# Patient Record
Sex: FEMALE | Race: WHITE | NOT HISPANIC OR LATINO | Employment: FULL TIME | URBAN - NONMETROPOLITAN AREA
[De-identification: names, ages, dates, MRNs, and addresses within clinical notes are randomized per-mention and may not be internally consistent; named-entity substitution may affect disease eponyms.]

---

## 2017-02-09 ENCOUNTER — OFFICE VISIT (OUTPATIENT)
Dept: CARDIOLOGY | Facility: CLINIC | Age: 47
End: 2017-02-09

## 2017-02-09 VITALS
OXYGEN SATURATION: 99 % | DIASTOLIC BLOOD PRESSURE: 73 MMHG | HEART RATE: 80 BPM | HEIGHT: 67 IN | WEIGHT: 206.2 LBS | BODY MASS INDEX: 32.36 KG/M2 | SYSTOLIC BLOOD PRESSURE: 120 MMHG

## 2017-02-09 DIAGNOSIS — I10 ESSENTIAL HYPERTENSION: ICD-10-CM

## 2017-02-09 DIAGNOSIS — R60.9 PERIPHERAL EDEMA: ICD-10-CM

## 2017-02-09 DIAGNOSIS — I25.119 CORONARY ARTERY DISEASE INVOLVING NATIVE CORONARY ARTERY WITH ANGINA PECTORIS, UNSPECIFIED WHETHER NATIVE OR TRANSPLANTED HEART (HCC): ICD-10-CM

## 2017-02-09 DIAGNOSIS — R53.83 MALAISE AND FATIGUE: ICD-10-CM

## 2017-02-09 DIAGNOSIS — E78.5 DYSLIPIDEMIA: ICD-10-CM

## 2017-02-09 DIAGNOSIS — Z78.9 HEALTH MAINTENANCE ALTERATION: ICD-10-CM

## 2017-02-09 DIAGNOSIS — I25.10 CAD IN NATIVE ARTERY: Primary | Chronic | ICD-10-CM

## 2017-02-09 DIAGNOSIS — F41.9 ANXIETY: Chronic | ICD-10-CM

## 2017-02-09 DIAGNOSIS — E78.5 HYPERLIPIDEMIA, UNSPECIFIED HYPERLIPIDEMIA TYPE: ICD-10-CM

## 2017-02-09 DIAGNOSIS — R53.81 MALAISE AND FATIGUE: ICD-10-CM

## 2017-02-09 PROCEDURE — 99213 OFFICE O/P EST LOW 20 MIN: CPT | Performed by: NURSE PRACTITIONER

## 2017-02-09 RX ORDER — BUPROPION HYDROCHLORIDE 300 MG/1
300 TABLET ORAL DAILY
Qty: 90 TABLET | Refills: 3 | Status: SHIPPED | OUTPATIENT
Start: 2017-02-09 | End: 2017-03-13 | Stop reason: SDUPTHER

## 2017-02-09 RX ORDER — ATORVASTATIN CALCIUM 40 MG/1
40 TABLET, FILM COATED ORAL EVERY EVENING
Qty: 90 TABLET | Refills: 3 | Status: SHIPPED | OUTPATIENT
Start: 2017-02-09 | End: 2018-04-12 | Stop reason: SDUPTHER

## 2017-02-09 RX ORDER — BUPROPION HYDROCHLORIDE 300 MG/1
300 TABLET ORAL DAILY
Qty: 90 TABLET | Refills: 3 | Status: SHIPPED | OUTPATIENT
Start: 2017-02-09 | End: 2017-02-09

## 2017-02-09 RX ORDER — NITROGLYCERIN 0.4 MG/1
0.4 TABLET SUBLINGUAL
Qty: 30 TABLET | Refills: 3 | Status: SHIPPED | OUTPATIENT
Start: 2017-02-09 | End: 2017-05-04 | Stop reason: SDUPTHER

## 2017-02-09 RX ORDER — FUROSEMIDE 20 MG/1
20 TABLET ORAL DAILY
Qty: 90 TABLET | Refills: 3 | Status: SHIPPED | OUTPATIENT
Start: 2017-02-09 | End: 2019-06-05 | Stop reason: SDUPTHER

## 2017-02-09 RX ORDER — CLOPIDOGREL BISULFATE 75 MG/1
75 TABLET ORAL DAILY
Qty: 90 TABLET | Refills: 3 | Status: SHIPPED | OUTPATIENT
Start: 2017-02-09 | End: 2018-03-05 | Stop reason: SDUPTHER

## 2017-02-09 RX ORDER — FUROSEMIDE 20 MG/1
TABLET ORAL
Qty: 90 TABLET | Refills: 3 | Status: SHIPPED | OUTPATIENT
Start: 2017-02-09 | End: 2017-02-09 | Stop reason: SDUPTHER

## 2017-02-09 RX ORDER — POTASSIUM CHLORIDE 750 MG/1
10 TABLET, EXTENDED RELEASE ORAL DAILY
Qty: 90 TABLET | Refills: 3 | Status: SHIPPED | OUTPATIENT
Start: 2017-02-09 | End: 2017-04-25 | Stop reason: SDUPTHER

## 2017-02-09 RX ORDER — POTASSIUM CHLORIDE 750 MG/1
TABLET, EXTENDED RELEASE ORAL
Qty: 90 TABLET | Refills: 3 | Status: SHIPPED | OUTPATIENT
Start: 2017-02-09 | End: 2017-02-09 | Stop reason: SDUPTHER

## 2017-02-09 NOTE — PROGRESS NOTES
Subjective   Radha Van is a 46 y.o. female     Chief Complaint   Patient presents with   • Follow-up     6 mo.       HPI    Problem List:    1. CAD  1.1. LHC 1/7/16 - Stent LAD  2. Hypertension  3. Dyspnea  3.1 Echo 11/2/15 - mild LVH; EF > 65%; DD I; trace MR & TR; mild MD; PA 20-25  4. Edema  5. Anxiety     Patient is a 46-year-old female who presents today for a follow-up.  She denies any chest pain, pressure, palpitations, fluttering, dizziness, presyncope, syncope, orthopnea, PND or edema.  She denies any shortness of breath with her daily routine.  She says she has been doing very well for the most part.  She has been having a little increase in anxiety so she would like to try to increase her dose of her Wellbutrin.  We went over her labs.     Current Outpatient Prescriptions   Medication Sig Dispense Refill   • aspirin (ASPIRIN LOW DOSE) 81 MG EC tablet Take 81 mg by mouth Daily.     • atorvastatin (LIPITOR) 40 MG tablet Take 1 tablet by mouth Every Evening. 90 tablet 3   • clopidogrel (PLAVIX) 75 MG tablet Take 1 tablet by mouth Daily. 90 tablet 3   • furosemide (LASIX) 20 MG tablet MAY TAKE AN ADDITIONAL DOSE ON THE DAYS THAT THE SWELLING IS INCREASED. 90 tablet 3   • potassium chloride (KLOR-CON) 10 MEQ CR tablet MAY TAKE AN ADDITIONAL DOSE ON THE DAYS THAT THE SWELLING IS INCREASED WITH LASIX 90 tablet 3   • buPROPion XL (WELLBUTRIN XL) 300 MG 24 hr tablet Take 1 tablet by mouth Daily. 90 tablet 3   • nitroglycerin (NITROSTAT) 0.4 MG SL tablet Place 1 tablet under the tongue Every 5 (Five) Minutes As Needed for chest pain. 30 tablet 3     No current facility-administered medications for this visit.        ALLERGIES    Review of patient's allergies indicates no known allergies.    Past Medical History   Diagnosis Date   • Ankle fracture, right      She does have edema in her RLE due to cracked bone in her right ankle. Patient says she fell down stairs. She is suppose to have a boot on, but because  of the weather she wore a sneaker. Patient says that she had just started to exercise again when this happened. Foot has been painful today.   • Anxiety    • CAD in native artery      1.1. LHC 1/7/16 - Stent LAD   • Chest pain    • Dyspnea      3.1 Echo 11/2/15 - mild LVH; EF > 65%; DD I; trace MR & TR; mild [place]; PA 20-25   • Edema      Related to ankle fracture.   • Hypertension      The patient presents for follow-up of primary hypertension. The patient states she has been stable with her blood pressure control since the last visit. Comorbid Illnesses: coronary artery disease.    • Obesity      BMI 31.95.       Social History     Social History   • Marital status:      Spouse name: N/A   • Number of children: N/A   • Years of education: N/A     Occupational History   • Not on file.     Social History Main Topics   • Smoking status: Never Smoker   • Smokeless tobacco: Not on file   • Alcohol use No   • Drug use: No   • Sexual activity: Not on file     Other Topics Concern   • Not on file     Social History Narrative       Family History   Problem Relation Age of Onset   • Colon cancer Other    • Stroke Other      Total of 3   • Hypertension Other    • Heart attack Other    • Other Other      High cholesterol & Heart Surgery   • Lupus Other      Systemic lupus erythematosus       Review of Systems   Constitutional: Positive for fatigue. Negative for diaphoresis.   HENT: Positive for rhinorrhea and sneezing.    Eyes: Positive for visual disturbance (glasses).   Respiratory: Negative for chest tightness and shortness of breath.    Cardiovascular: Negative for chest pain, palpitations and leg swelling.   Gastrointestinal: Negative for nausea and vomiting.   Endocrine: Negative.    Genitourinary: Positive for difficulty urinating.   Musculoskeletal: Positive for arthralgias (arthritis ). Negative for back pain and neck pain.   Skin: Negative.    Allergic/Immunologic: Positive for environmental allergies  "(seasonal).   Neurological: Positive for headaches (cluster headaches at times ). Negative for dizziness, syncope and light-headedness.   Hematological: Bruises/bleeds easily (bruise).   Psychiatric/Behavioral: Negative.        Objective   Visit Vitals   • /73 (BP Location: Left arm, Patient Position: Sitting)   • Pulse 80   • Ht 67\" (170.2 cm)   • Wt 206 lb 3.2 oz (93.5 kg)   • SpO2 99%   • BMI 32.3 kg/m2     Lab Results (most recent)     None        Physical Exam   Constitutional: She is oriented to person, place, and time. Vital signs are normal. She appears well-developed and well-nourished. She is active and cooperative.   HENT:   Head: Normocephalic.   Eyes: Lids are normal.   Neck: Normal carotid pulses, no hepatojugular reflux and no JVD present. Carotid bruit is not present.   Cardiovascular: Normal rate, regular rhythm and normal heart sounds.    Pulses:       Radial pulses are 2+ on the right side, and 2+ on the left side.        Dorsalis pedis pulses are 2+ on the right side, and 2+ on the left side.        Posterior tibial pulses are 2+ on the right side, and 2+ on the left side.   No edema BLE.    Pulmonary/Chest: Effort normal and breath sounds normal.   Abdominal: Normal appearance.   Neurological: She is alert and oriented to person, place, and time.   Skin: Skin is warm, dry and intact.   Psychiatric: She has a normal mood and affect. Her speech is normal and behavior is normal. Judgment and thought content normal. Cognition and memory are normal.         Assessment/Plan      Diagnosis Plan   1. CAD in native artery  nitroglycerin (NITROSTAT) 0.4 MG SL tablet    Lipid Panel    Comprehensive Metabolic Panel    Comprehensive Metabolic Panel    Lipid Panel   2. Essential hypertension     3. Hyperlipidemia, unspecified hyperlipidemia type  Lipid Panel    Lipid Panel   4. Dyslipidemia  atorvastatin (LIPITOR) 40 MG tablet    clopidogrel (PLAVIX) 75 MG tablet   5. Coronary artery disease involving " native coronary artery with angina pectoris, unspecified whether native or transplanted heart  atorvastatin (LIPITOR) 40 MG tablet    clopidogrel (PLAVIX) 75 MG tablet   6. Peripheral edema  potassium chloride (KLOR-CON) 10 MEQ CR tablet    furosemide (LASIX) 20 MG tablet   7. Health maintenance alteration  Lipid Panel    Comprehensive Metabolic Panel    TSH    CBC & Differential    Vitamin B12    Folate    Vitamin D 1,25 Dihydroxy    Comprehensive Metabolic Panel    CBC & Differential    Lipid Panel    TSH    Vitamin B12    Folate    Vitamin D 1,25 Dihydroxy   8. Malaise and fatigue  TSH    CBC & Differential    Vitamin B12    Folate    Vitamin D 1,25 Dihydroxy    CBC & Differential    TSH    Vitamin B12    Folate    Vitamin D 1,25 Dihydroxy   9. Anxiety  buPROPion XL (WELLBUTRIN XL) 300 MG 24 hr tablet       Return in about 6 months (around 8/9/2017).         Patient is doing very well from a cardiac standpoint.  She will continue her medication except we will increase her Wellbutrin.  We discussed coming off of Plavix, but she would like to stay on it.  She will get lipids, CMP, TSH, Vit B12, Vit D and CBC.  She will follow-up in 6 mos or sooner if any changes.

## 2017-02-16 ENCOUNTER — TELEPHONE (OUTPATIENT)
Dept: CARDIOLOGY | Facility: CLINIC | Age: 47
End: 2017-02-16

## 2017-02-16 NOTE — TELEPHONE ENCOUNTER
----- Message from Tina Dejesus sent at 2/16/2017  9:59 AM EST -----  Contact: PATIENT  THE PATIENT WANTS TO KNOW IF SHE IS ABLE TO TAKE ZYRTEC WITH THE MEDICATIONS WE CURRENTLY HAVE HER ON.  SHE CAN BE REACHED [phone].  THANKS

## 2017-03-13 DIAGNOSIS — F41.9 ANXIETY: Chronic | ICD-10-CM

## 2017-03-13 RX ORDER — BUPROPION HYDROCHLORIDE 300 MG/1
300 TABLET ORAL DAILY
Qty: 90 TABLET | Refills: 3 | Status: SHIPPED | OUTPATIENT
Start: 2017-03-13 | End: 2017-03-13 | Stop reason: SDUPTHER

## 2017-03-13 RX ORDER — BUPROPION HYDROCHLORIDE 300 MG/1
300 TABLET ORAL DAILY
Qty: 90 TABLET | Refills: 3 | Status: SHIPPED | OUTPATIENT
Start: 2017-03-13 | End: 2018-04-12 | Stop reason: SDUPTHER

## 2017-04-25 DIAGNOSIS — R60.9 PERIPHERAL EDEMA: ICD-10-CM

## 2017-04-25 RX ORDER — POTASSIUM CHLORIDE 750 MG/1
10 TABLET, EXTENDED RELEASE ORAL DAILY
Qty: 90 TABLET | Refills: 3 | Status: SHIPPED | OUTPATIENT
Start: 2017-04-25 | End: 2018-05-17 | Stop reason: SDUPTHER

## 2017-04-25 RX ORDER — POTASSIUM CHLORIDE 750 MG/1
10 TABLET, EXTENDED RELEASE ORAL DAILY
Qty: 90 TABLET | Refills: 3 | Status: SHIPPED | OUTPATIENT
Start: 2017-04-25 | End: 2017-08-09 | Stop reason: ALTCHOICE

## 2017-05-04 DIAGNOSIS — I25.10 CAD IN NATIVE ARTERY: Chronic | ICD-10-CM

## 2017-05-04 RX ORDER — NITROGLYCERIN 0.4 MG/1
0.4 TABLET SUBLINGUAL
Qty: 30 TABLET | Refills: 3 | Status: SHIPPED | OUTPATIENT
Start: 2017-05-04 | End: 2017-08-11 | Stop reason: SDUPTHER

## 2017-08-09 ENCOUNTER — OFFICE VISIT (OUTPATIENT)
Dept: CARDIOLOGY | Facility: CLINIC | Age: 47
End: 2017-08-09

## 2017-08-09 VITALS
OXYGEN SATURATION: 98 % | HEART RATE: 88 BPM | DIASTOLIC BLOOD PRESSURE: 77 MMHG | HEIGHT: 67 IN | WEIGHT: 204.4 LBS | BODY MASS INDEX: 32.08 KG/M2 | SYSTOLIC BLOOD PRESSURE: 117 MMHG

## 2017-08-09 DIAGNOSIS — I25.10 CAD IN NATIVE ARTERY: Primary | Chronic | ICD-10-CM

## 2017-08-09 DIAGNOSIS — E78.5 HYPERLIPIDEMIA, UNSPECIFIED HYPERLIPIDEMIA TYPE: ICD-10-CM

## 2017-08-09 DIAGNOSIS — I10 ESSENTIAL HYPERTENSION: ICD-10-CM

## 2017-08-09 PROCEDURE — 93000 ELECTROCARDIOGRAM COMPLETE: CPT | Performed by: NURSE PRACTITIONER

## 2017-08-09 PROCEDURE — 99214 OFFICE O/P EST MOD 30 MIN: CPT | Performed by: NURSE PRACTITIONER

## 2017-08-09 RX ORDER — MEDROXYPROGESTERONE ACETATE 10 MG/1
10 TABLET ORAL DAILY
Refills: 0 | COMMUNITY
Start: 2017-07-28 | End: 2018-09-05

## 2017-08-09 NOTE — PATIENT INSTRUCTIONS
Edema  Edema is an abnormal buildup of fluids in your body tissues. Edema is somewhat dependent on gravity to pull the fluid to the lowest place in your body. That makes the condition more common in the legs and thighs (lower extremities). Painless swelling of the feet and ankles is common and becomes more likely as you get older. It is also common in looser tissues, like around your eyes.   When the affected area is squeezed, the fluid may move out of that spot and leave a dent for a few moments. This dent is called pitting.   CAUSES   There are many possible causes of edema. Eating too much salt and being on your feet or sitting for a long time can cause edema in your legs and ankles. Hot weather may make edema worse. Common medical causes of edema include:  · Heart failure.  · Liver disease.  · Kidney disease.  · Weak blood vessels in your legs.  · Cancer.  · An injury.  · Pregnancy.  · Some medications.  · Obesity.   SYMPTOMS   Edema is usually painless. Your skin may look swollen or shiny.   DIAGNOSIS   Your health care provider may be able to diagnose edema by asking about your medical history and doing a physical exam. You may need to have tests such as X-rays, an electrocardiogram, or blood tests to check for medical conditions that may cause edema.   TREATMENT   Edema treatment depends on the cause. If you have heart, liver, or kidney disease, you need the treatment appropriate for these conditions. General treatment may include:  · Elevation of the affected body part above the level of your heart.  · Compression of the affected body part. Pressure from elastic bandages or support stockings squeezes the tissues and forces fluid back into the blood vessels. This keeps fluid from entering the tissues.  · Restriction of fluid and salt intake.  · Use of a water pill (diuretic). These medications are appropriate only for some types of edema. They pull fluid out of your body and make you urinate more often. This  gets rid of fluid and reduces swelling, but diuretics can have side effects. Only use diuretics as directed by your health care provider.  HOME CARE INSTRUCTIONS   · Keep the affected body part above the level of your heart when you are lying down.    · Do not sit still or stand for prolonged periods.    · Do not put anything directly under your knees when lying down.  · Do not wear constricting clothing or garters on your upper legs.    · Exercise your legs to work the fluid back into your blood vessels. This may help the swelling go down.    · Wear elastic bandages or support stockings to reduce ankle swelling as directed by your health care provider.    · Eat a low-salt diet to reduce fluid if your health care provider recommends it.    · Only take medicines as directed by your health care provider.   SEEK MEDICAL CARE IF:   · Your edema is not responding to treatment.  · You have heart, liver, or kidney disease and notice symptoms of edema.  · You have edema in your legs that does not improve after elevating them.    · You have sudden and unexplained weight gain.  SEEK IMMEDIATE MEDICAL CARE IF:   · You develop shortness of breath or chest pain.    · You cannot breathe when you lie down.  · You develop pain, redness, or warmth in the swollen areas.    · You have heart, liver, or kidney disease and suddenly get edema.  · You have a fever and your symptoms suddenly get worse.  MAKE SURE YOU:   · Understand these instructions.  · Will watch your condition.  · Will get help right away if you are not doing well or get worse.     This information is not intended to replace advice given to you by your health care provider. Make sure you discuss any questions you have with your health care provider.     Document Released: 12/18/2006 Document Revised: 04/10/2017 Document Reviewed: 10/10/2014  [employer] Interactive Patient Education ©2017 [employer]  Coronary Artery Disease, Female  Coronary artery disease (CAD) is a  process in which the blood vessels of the heart (coronary arteries) become narrow or blocked. The narrowing or blockage can lead to decreased blood flow to the heart muscle (angina). Symptoms known as angina can develop if the blood flow is reduced to the heart for a short period of time. Prolonged reduced blood flow can cause a heart attack (myocardial infarction, MI).  CAD is a leading cause of death for women. More women die from CAD than from cancer, lung disease, and accidents combined. It is important for women to understand the risks, symptoms, and treatment options for CAD.  CAUSES  Atherosclerosis is the cause of CAD. Atherosclerosis is the buildup of fat and cholesterol (plaque) on the inside of the arteries. Over time, the plaque may narrow or block the artery, and this will lessen blood flow to the heart. Plaque can also become weak and break off within a coronary artery to form a clot and cause a sudden blockage.  RISK FACTORS  Many risk factors increase your chances of getting CAD, including:  · High cholesterol levels.  · High blood pressure (hypertension).  · Tobacco use.  · Diabetes.  · Age. Women over age 55 are at a greater risk of CAD.  · Menopause.    All postmenopausal women are at greater risk of CAD.    Women who have experienced menopause between the ages of 40-45 (early menopause) are at a higher risk of CAD.    Women who have experienced menopause before age 40 (premature menopause) are at an extremely high risk of CAD.  · Family history of CAD.  · Obesity.  · Lack of exercise.  · A diet high in saturated fats.  SYMPTOMS   Many people do not experience any symptoms during the early stages of CAD. As the condition progresses, symptoms may include:  · Chest pain.    The pain can be described as crushing or squeezing, or a tightness, pressure, fullness, or heaviness in the chest.    The pain can last more than a few minutes or can stop and recur.  · Pain in the arms, neck, jaw, or  back.  · Unexplained heartburn or indigestion.  · Shortness of breath.  · Nausea.  · Sudden cold sweats.  · Sudden light-headedness.  Many women have chest discomfort and the other symptoms. However, women often have different (atypical) symptoms, such as:  · Fatigue.  · Unexplained feelings of nervousness or anxiety.  · Unexplained weakness.  · Dizziness or fainting.  Sometimes, women may not have any symptoms of CAD.  DIAGNOSIS   Tests to diagnose CAD may include:  · ECG (electrocardiogram).  · Exercise stress test. This looks for signs of blockage when the heart is being exercised.  · Pharmacologic stress test. This test looks for signs of blockage when the heart is being stressed with a medicine.  · Blood tests.  · Coronary angiogram. This is a procedure to look at the coronary arteries to see if there is any blockage.  TREATMENT  The treatment of CAD may include the following:  · Healthy behavioral changes to reduce or control risk factors.  · Medicine.  · Coronary stenting. A stent helps to keep an artery open.  · Coronary angioplasty. This procedure widens a narrowed or blocked artery.  · Coronary artery bypass surgery. This will allow your blood to pass the blockage (bypass) to reach your heart.  HOME CARE INSTRUCTIONS  · Take medicines only as directed by your health care provider.  · Do not take the following medicines unless your health care provider approves:    Nonsteroidal anti-inflammatory drugs (NSAIDs), such as ibuprofen, naproxen, or celecoxib.    Vitamin supplements that contain vitamin A, vitamin E, or both.    Hormone replacement therapy that contains estrogen with or without progestin.  · Manage other health conditions such as hypertension and diabetes as directed by your health care provider.  · Follow a heart-healthy diet. A dietitian can help to educate you about healthy food options and changes.  · Use healthy cooking methods such as roasting, grilling, broiling, baking, poaching, steaming,  or stir-frying. Talk to a dietitian to learn more about healthy cooking methods.  · Follow an exercise program approved by your health care provider.  · Maintain a healthy weight. Lose weight as approved by your health care provider.  · Plan rest periods when fatigued.  · Learn to manage stress.  · Do not use any tobacco products, including cigarettes, chewing tobacco, or electronic cigarettes. If you need help quitting, ask your health care provider.  · If you drink alcohol, and your health care provider approves, limit your alcohol intake to no more than 1 drink per day. One drink equals 12 ounces of beer, 5 ounces of wine, or 1½ ounces of hard liquor.  · Stop illegal drug use.  · Your health care provider may ask you to monitor your blood pressure. A blood pressure reading consists of a higher number over a lower number, such as 110 over 72, which is written as 110/72. Ideally, your blood pressure should be:    Below 140/90 if you have no other medical conditions.    Below 130/80 if you have diabetes or kidney disease.  · Keep all follow-up visits as directed by your health care provider. This is important.  SEEK IMMEDIATE MEDICAL CARE IF:  · You have pain in your chest, neck, arm, jaw, stomach, or back that lasts more than a few minutes, is recurring, or is unrelieved by taking medicine under your tongue (sublingual nitroglycerin).  · You have profuse sweating without cause.  · You have unexplained:    Heartburn or indigestion.    Shortness of breath or difficulty breathing.    Nausea or vomiting.    Fatigue.    Feelings of nervousness or anxiety.    Weakness.    Diarrhea.  · You have sudden light-headedness or dizziness.  · You faint.  These symptoms may represent a serious problem that is an emergency. Do not wait to see if the symptoms will go away. Get medical help right away. Call your local emergency services (911 in the [place] Do not drive yourself to the hospital.     This information is not intended to  replace advice given to you by your health care provider. Make sure you discuss any questions you have with your health care provider.     Document Released: 03/11/2013 Document Revised: 01/08/2016 Document Reviewed: 04/21/2015  [employer] Interactive Patient Education ©2017 [employer]

## 2017-08-09 NOTE — PROGRESS NOTES
Subjective   Radha Van is a 46 y.o. female     Chief Complaint   Patient presents with   • Hypertension     patient presents for a follow up, denies any complants        HPI    Problem List:    1. CAD  1.1. LHC 1/7/16 - Stent LAD  2. Hypertension  3. Dyspnea  3.1 Echo 11/2/15 - mild LVH; EF > 65%; DD I; trace MR & TR; mild HI; PA 20-25  4. Edema  5. Anxiety     Patient is a 46-year-old female who presents today for follow-up.  She denies any chest pain, pressure, palpitations, fluttering, dizziness, presyncope, syncope, orthopnea or PND.  She will get swelling only if she is in legs all day and leg yesterday she was sitting on concrete.  She says this goes away overnight.  She denies any shortness of breath with activity.      We went over her labs.  Current Outpatient Prescriptions   Medication Sig Dispense Refill   • aspirin (ASPIRIN LOW DOSE) 81 MG EC tablet Take 81 mg by mouth Daily.     • atorvastatin (LIPITOR) 40 MG tablet Take 1 tablet by mouth Every Evening. 90 tablet 3   • buPROPion XL (WELLBUTRIN XL) 300 MG 24 hr tablet Take 1 tablet by mouth Daily. 90 tablet 3   • clopidogrel (PLAVIX) 75 MG tablet Take 1 tablet by mouth Daily. 90 tablet 3   • furosemide (LASIX) 20 MG tablet Take 1 tablet by mouth Daily. MAY TAKE AN ADDITIONAL DOSE ON THE DAYS THAT THE SWELLING IS INCREASED. 90 tablet 3   • medroxyPROGESTERone (PROVERA) 10 MG tablet Take 10 mg by mouth Daily. Patient takes from 16-25 of the month  0   • nitroglycerin (NITROSTAT) 0.4 MG SL tablet Place 1 tablet under the tongue Every 5 (Five) Minutes As Needed for Chest Pain. 30 tablet 3   • potassium chloride (KLOR-CON) 10 MEQ CR tablet Take 1 tablet by mouth Daily. MAY TAKE AN ADDITIONAL DOSE ON THE DAYS THAT THE SWELLING IS INCREASED WITH LASIX 90 tablet 3     No current facility-administered medications for this visit.        ALLERGIES    Review of patient's allergies indicates no known allergies.    Past Medical History:   Diagnosis Date   •  Ankle fracture, right     She does have edema in her RLE due to cracked bone in her right ankle. Patient says she fell down stairs. She is suppose to have a boot on, but because of the weather she wore a sneaker. Patient says that she had just started to exercise again when this happened. Foot has been painful today.   • Anxiety    • CAD in native artery     1.1. C 1/7/16 - Stent LAD   • Chest pain    • Diverticulitis    • Dyspnea     3.1 Echo 11/2/15 - mild LVH; EF > 65%; DD I; trace MR & TR; mild [place]; PA 20-25   • Edema     Related to ankle fracture.   • Hypertension     The patient presents for follow-up of primary hypertension. The patient states she has been stable with her blood pressure control since the last visit. Comorbid Illnesses: coronary artery disease.    • Obesity     BMI 31.95.       Social History     Social History   • Marital status:      Spouse name: N/A   • Number of children: N/A   • Years of education: N/A     Occupational History   • Not on file.     Social History Main Topics   • Smoking status: Never Smoker   • Smokeless tobacco: Not on file   • Alcohol use No   • Drug use: No   • Sexual activity: Not on file     Other Topics Concern   • Not on file     Social History Narrative       Family History   Problem Relation Age of Onset   • Colon cancer Other    • Stroke Other      Total of 3   • Hypertension Other    • Heart attack Other    • Other Other      High cholesterol & Heart Surgery   • Lupus Other      Systemic lupus erythematosus       Review of Systems   Constitutional: Negative for diaphoresis and fatigue.   HENT: Positive for sinus pressure. Negative for congestion, postnasal drip, rhinorrhea and sneezing.    Eyes: Positive for visual disturbance (reading glasses ).   Respiratory: Negative for chest tightness and shortness of breath.    Cardiovascular: Positive for leg swelling (yesterday ankles swollen, but on concrete all day ). Negative for chest pain and palpitations.  "  Gastrointestinal: Negative for nausea and vomiting.   Endocrine: Negative.    Genitourinary: Positive for frequency. Negative for difficulty urinating.   Musculoskeletal: Positive for back pain (low back, right before period ). Negative for arthralgias and neck pain.   Skin: Negative.    Allergic/Immunologic: Positive for environmental allergies.   Neurological: Positive for headaches (with sinus pressure ). Negative for dizziness, syncope and light-headedness.   Hematological: Negative.    Psychiatric/Behavioral: The patient is nervous/anxious.        Objective   /77 (BP Location: Left arm, Patient Position: Sitting)  Pulse 88  Ht 67\" (170.2 cm)  Wt 204 lb 6.4 oz (92.7 kg)  SpO2 98%  BMI 32.01 kg/m2  Lab Results (most recent)     None        Physical Exam   Constitutional: She is oriented to person, place, and time. Vital signs are normal. She appears well-developed and well-nourished. She is active and cooperative.   HENT:   Head: Normocephalic.   Eyes: Lids are normal.   Neck: Normal carotid pulses, no hepatojugular reflux and no JVD present. Carotid bruit is not present.   Cardiovascular: Normal rate, regular rhythm and normal heart sounds.    Pulses:       Radial pulses are 2+ on the right side, and 2+ on the left side.        Dorsalis pedis pulses are 2+ on the right side, and 2+ on the left side.        Posterior tibial pulses are 2+ on the right side, and 2+ on the left side.   No edema BLE.    Pulmonary/Chest: Effort normal and breath sounds normal.   Abdominal: Normal appearance and bowel sounds are normal.   Neurological: She is alert and oriented to person, place, and time.   Skin: Skin is warm, dry and intact.   Psychiatric: She has a normal mood and affect. Her speech is normal and behavior is normal. Judgment and thought content normal. Cognition and memory are normal.       Procedure     ECG 12 Lead  Date/Time: 8/9/2017 9:21 AM  Performed by: MARA HERNANDEZ  Authorized by: MARA HERNANDEZ " S   Comparison: compared with previous ECG from 8/10/2016  Similar to previous ECG  Rhythm: sinus rhythm  Rate: normal  BPM: 71  QRS axis: normal  Clinical impression: normal ECG  Comments: Hypertension                   Assessment/Plan      Diagnosis Plan   1. CAD in native artery     2. Essential hypertension  ECG 12 Lead   3. Hyperlipidemia, unspecified hyperlipidemia type         Return in about 6 months (around 2/9/2018).    CAD-patient is on aspirin and statin.  Hypertension-patient doing well.  Hyperlipidemia-patient is on Lipitor and cholesterol is doing very well.  She will continue her medication regimen.  She will follow-up in 6 months or sooner if any changes.

## 2017-08-11 ENCOUNTER — TELEPHONE (OUTPATIENT)
Dept: CARDIOLOGY | Facility: CLINIC | Age: 47
End: 2017-08-11

## 2017-08-11 DIAGNOSIS — I25.10 CAD IN NATIVE ARTERY: Chronic | ICD-10-CM

## 2017-08-11 RX ORDER — NITROGLYCERIN 0.4 MG/1
0.4 TABLET SUBLINGUAL
Qty: 30 TABLET | Refills: 5 | Status: SHIPPED | OUTPATIENT
Start: 2017-08-11 | End: 2021-03-04 | Stop reason: SDUPTHER

## 2017-08-11 NOTE — TELEPHONE ENCOUNTER
----- Message from Isabela Velez sent at 8/11/2017  8:34 AM EDT -----  Contact: PT  NEEDS A REFILL OF GLYCERINE PILLS CALLED IN TO [facility]

## 2018-02-12 ENCOUNTER — TELEPHONE (OUTPATIENT)
Dept: CARDIOLOGY | Facility: CLINIC | Age: 48
End: 2018-02-12

## 2018-02-12 NOTE — TELEPHONE ENCOUNTER
----- Message from Sowmya Garcia sent at 2/12/2018  8:51 AM EST -----   JOEY HAS SOME QUESTIONS REGARDING SOME WORK RESTRICTIONS. SHE CAN BE REACHED [phone].

## 2018-03-05 DIAGNOSIS — E78.5 DYSLIPIDEMIA: ICD-10-CM

## 2018-03-05 DIAGNOSIS — I25.119 CORONARY ARTERY DISEASE INVOLVING NATIVE CORONARY ARTERY WITH ANGINA PECTORIS, UNSPECIFIED WHETHER NATIVE OR TRANSPLANTED HEART (HCC): ICD-10-CM

## 2018-03-05 RX ORDER — CLOPIDOGREL BISULFATE 75 MG/1
TABLET ORAL
Qty: 90 TABLET | Refills: 3 | Status: SHIPPED | OUTPATIENT
Start: 2018-03-05 | End: 2019-06-03 | Stop reason: SDUPTHER

## 2018-03-06 ENCOUNTER — OFFICE VISIT (OUTPATIENT)
Dept: CARDIOLOGY | Facility: CLINIC | Age: 48
End: 2018-03-06

## 2018-03-06 ENCOUNTER — APPOINTMENT (OUTPATIENT)
Dept: LAB | Facility: HOSPITAL | Age: 48
End: 2018-03-06

## 2018-03-06 VITALS
DIASTOLIC BLOOD PRESSURE: 77 MMHG | HEART RATE: 78 BPM | BODY MASS INDEX: 32.96 KG/M2 | HEIGHT: 67 IN | SYSTOLIC BLOOD PRESSURE: 117 MMHG | OXYGEN SATURATION: 96 % | WEIGHT: 210 LBS

## 2018-03-06 DIAGNOSIS — Z00.00 HEALTHCARE MAINTENANCE: ICD-10-CM

## 2018-03-06 DIAGNOSIS — I10 ESSENTIAL HYPERTENSION: ICD-10-CM

## 2018-03-06 DIAGNOSIS — E78.5 HYPERLIPIDEMIA, UNSPECIFIED HYPERLIPIDEMIA TYPE: ICD-10-CM

## 2018-03-06 DIAGNOSIS — I25.10 CAD IN NATIVE ARTERY: Primary | Chronic | ICD-10-CM

## 2018-03-06 DIAGNOSIS — R06.02 SHORTNESS OF BREATH: ICD-10-CM

## 2018-03-06 PROCEDURE — 99213 OFFICE O/P EST LOW 20 MIN: CPT | Performed by: NURSE PRACTITIONER

## 2018-03-06 PROCEDURE — 84443 ASSAY THYROID STIM HORMONE: CPT | Performed by: NURSE PRACTITIONER

## 2018-03-06 PROCEDURE — 80053 COMPREHEN METABOLIC PANEL: CPT | Performed by: NURSE PRACTITIONER

## 2018-03-06 PROCEDURE — 80061 LIPID PANEL: CPT | Performed by: NURSE PRACTITIONER

## 2018-03-06 NOTE — PATIENT INSTRUCTIONS
Obesity, Adult  Obesity is the condition of having too much total body fat. Being overweight or obese means that your weight is greater than what is considered healthy for your body size. Obesity is determined by a measurement called BMI. BMI is an estimate of body fat and is calculated from height and weight. For adults, a BMI of 30 or higher is considered obese.  Obesity can eventually lead to other health concerns and major illnesses, including:  · Stroke.  · Coronary artery disease (CAD).  · Type 2 diabetes.  · Some types of cancer, including cancers of the colon, breast, uterus, and gallbladder.  · Osteoarthritis.  · High blood pressure (hypertension).  · High cholesterol.  · Sleep apnea.  · Gallbladder stones.  · Infertility problems.  What are the causes?  The main cause of obesity is taking in (consuming) more calories than your body uses for energy. Other factors that contribute to this condition may include:  · Being born with genes that make you more likely to become obese.  · Having a medical condition that causes obesity. These conditions include:  ¨ Hypothyroidism.  ¨ Polycystic ovarian syndrome (PCOS).  ¨ Binge-eating disorder.  ¨ Cushing syndrome.  · Taking certain medicines, such as steroids, antidepressants, and seizure medicines.  · Not being physically active (sedentary lifestyle).  · Living where there are limited places to exercise safely or buy healthy foods.  · Not getting enough sleep.  What increases the risk?  The following factors may increase your risk of this condition:  · Having a family history of obesity.  · Being a woman of -American descent.  · Being a man of  descent.  What are the signs or symptoms?  Having excessive body fat is the main symptom of this condition.  How is this diagnosed?  This condition may be diagnosed based on:  · Your symptoms.  · Your medical history.  · A physical exam. Your health care provider may measure:  ¨ Your BMI. If you are an adult  with a BMI between 25 and less than 30, you are considered overweight. If you are an adult with a BMI of 30 or higher, you are considered obese.  ¨ The distances around your hips and your waist (circumferences). These may be compared to each other to help diagnose your condition.  ¨ Your skinfold thickness. Your health care provider may gently pinch a fold of your skin and measure it.  How is this treated?  Treatment for this condition often includes changing your lifestyle. Treatment may include some or all of the following:  · Dietary changes. Work with your health care provider and a dietitian to set a weight-loss goal that is healthy and reasonable for you. Dietary changes may include eating:  ¨ Smaller portions. A portion size is the amount of a particular food that is healthy for you to eat at one time. This varies from person to person.  ¨ Low-calorie or low-fat options.  ¨ More whole grains, fruits, and vegetables.  · Regular physical activity. This may include aerobic activity (cardio) and strength training.  · Medicine to help you lose weight. Your health care provider may prescribe medicine if you are unable to lose 1 pound a week after 6 weeks of eating more healthily and doing more physical activity.  · Surgery. Surgical options may include gastric banding and gastric bypass. Surgery may be done if:  ¨ Other treatments have not helped to improve your condition.  ¨ You have a BMI of 40 or higher.  ¨ You have life-threatening health problems related to obesity.  Follow these instructions at home:     Eating and drinking     · Follow recommendations from your health care provider about what you eat and drink. Your health care provider may advise you to:  ¨ Limit fast foods, sweets, and processed snack foods.  ¨ Choose low-fat options, such as low-fat milk instead of whole milk.  ¨ Eat 5 or more servings of fruits or vegetables every day.  ¨ Eat at home more often. This gives you more control over what you  eat.  ¨ Choose healthy foods when you eat out.  ¨ Learn what a healthy portion size is.  ¨ Keep low-fat snacks on hand.  ¨ Avoid sugary drinks, such as soda, fruit juice, iced tea sweetened with sugar, and flavored milk.  ¨ Eat a healthy breakfast.  · Drink enough water to keep your urine clear or pale yellow.  · Do not go without eating for long periods of time (do not fast) or follow a fad diet. Fasting and fad diets can be unhealthy and even dangerous.  Physical Activity   · Exercise regularly, as told by your health care provider. Ask your health care provider what types of exercise are safe for you and how often you should exercise.  · Warm up and stretch before being active.  · Cool down and stretch after being active.  · Rest between periods of activity.  Lifestyle   · Limit the time that you spend in front of your TV, computer, or video game system.  · Find ways to reward yourself that do not involve food.  · Limit alcohol intake to no more than 1 drink a day for nonpregnant women and 2 drinks a day for men. One drink equals 12 oz of beer, 5 oz of wine, or 1½ oz of hard liquor.  General instructions   · Keep a weight loss journal to keep track of the food you eat and how much you exercise you get.  · Take over-the-counter and prescription medicines only as told by your health care provider.  · Take vitamins and supplements only as told by your health care provider.  · Consider joining a support group. Your health care provider may be able to recommend a support group.  · Keep all follow-up visits as told by your health care provider. This is important.  Contact a health care provider if:  · You are unable to meet your weight loss goal after 6 weeks of dietary and lifestyle changes.  This information is not intended to replace advice given to you by your health care provider. Make sure you discuss any questions you have with your health care provider.  Document Released: 01/25/2006 Document Revised:  05/22/2017 Document Reviewed: 10/05/2016  [employer] Interactive Patient Education © 2017 [employer].  MyPlate from [employer]  The general, healthful diet is based on the 2010 Dietary Guidelines for Americans. The amount of food you need to eat from each food group depends on your age, sex, and level of physical activity and can be individualized by a dietitian. Go to ChooseMyPlate.gov for more information.  What do I need to know about the MyPlate plan?  · Enjoy your food, but eat less.  · Avoid oversized portions.  ¨ ½ of your plate should include fruits and vegetables.  ¨ ¼ of your plate should be grains.  ¨ ¼ of your plate should be protein.  Grains   · Make at least half of your grains whole grains.  · For a 2,000 calorie daily food plan, eat 6 oz every day.  · 1 oz is about 1 slice bread, 1 cup cereal, or ½ cup cooked rice, cereal, or pasta.  Vegetables   · Make half your plate fruits and vegetables.  · For a 2,000 calorie daily food plan, eat 2½ cups every day.  · 1 cup is about 1 cup raw or cooked vegetables or vegetable juice or 2 cups raw leafy greens.  Fruits   · Make half your plate fruits and vegetables.  · For a 2,000 calorie daily food plan, eat 2 cups every day.  · 1 cup is about 1 cup fruit or 100% fruit juice or ½ cup dried fruit.  Protein   · For a 2,000 calorie daily food plan, eat 5½ oz every day.  · 1 oz is about 1 oz meat, poultry, or fish, ¼ cup cooked beans, 1 egg, 1 Tbsp peanut butter, or ½ oz nuts or seeds.  Dairy   · Switch to fat-free or low-fat (1%) milk.  · For a 2,000 calorie daily food plan, eat 3 cups every day.  · 1 cup is about 1 cup milk or yogurt or soy milk (soy beverage), 1½ oz natural cheese, or 2 oz processed cheese.  Fats, Oils, and Empty Calories   · Only small amounts of oils are recommended.  · Empty calories are calories from solid fats or added sugars.  · Compare sodium in foods like soup, bread, and frozen meals. Choose the foods with lower numbers.  · Drink water instead  of sugary drinks.  What foods can I eat?  Grains   Whole grains such as whole wheat, quinoa, millet, and bulgur. Bread, rolls, and pasta made from whole grains. Brown or wild rice. Hot or cold cereals made from whole grains and without added sugar.  Vegetables   All fresh vegetables, especially fresh red, dark green, or orange vegetables. Peas and beans. Low-sodium frozen or canned vegetables prepared without added salt. Low-sodium vegetable juices.  Fruits   All fresh, frozen, and dried fruits. Canned fruit packed in water or fruit juice without added sugar. Fruit juices without added sugar.  Meats and Other Protein Sources   Boiled, baked, or grilled lean meat trimmed of fat. Skinless poultry. Fresh seafood and shellfish. Canned seafood packed in water. Unsalted nuts and unsalted nut butters. Tofu. Dried beans and pea. Eggs.  Dairy   Low-fat or fat-free milk, yogurt, and cheeses.  Sweets and Desserts   Frozen desserts made from low-fat milk.  Fats and Oils   Olive, peanut, and canola oils and margarine. Salad dressing and mayonnaise made from these oils.  Other   Soups and casseroles made from allowed ingredients and without added fat or salt.  The items listed above may not be a complete list of recommended foods or beverages. Contact your dietitian for more options.   What foods are not recommended?  Grains   Sweetened, low-fiber cereals. Packaged baked goods. Snack crackers and chips. Cheese crackers, butter crackers, and biscuits. Frozen waffles, sweet breads, doughnuts, pastries, packaged baking mixes, pancakes, cakes, and cookies.  Vegetables   Regular canned or frozen vegetables or vegetables prepared with salt. Canned tomatoes. Canned tomato sauce. Fried vegetables. Vegetables in cream sauce or cheese sauce.  Fruits   Fruits packed in syrup or made with added sugar.  Meats and Other Protein Sources   Marbled or fatty meats such as ribs. Poultry with skin. Fried meats, poultry, eggs, or fish. Sausages, hot  dogs, and deli meats such as pastrami, bologna, or salami.  Dairy   Whole milk, cream, cheeses made from whole milk, sour cream. Ice cream or yogurt made from whole milk or with added sugar.  Beverages   For adults, no more than one alcoholic drink per day. Regular soft drinks or other sugary beverages. Juice drinks.  Sweets and Desserts   Sugary or fatty desserts, candy, and other sweets.  Fats and Oils   Solid shortening or partially hydrogenated oils. Solid margarine. Margarine that contains trans fats. Butter.  The items listed above may not be a complete list of foods and beverages to avoid. Contact your dietitian for more information.   This information is not intended to replace advice given to you by your health care provider. Make sure you discuss any questions you have with your health care provider.  Document Released: 01/06/2009 Document Revised: 05/25/2017 Document Reviewed: 11/26/2014  [employer] Interactive Patient Education © 2017 [employer]    Social Anxiety Disorder, Adult  Social anxiety disorder, previously called social phobia, is a mental disorder. People with social anxiety disorder often feel nervous, afraid, or embarrassed when they are around other people in social situations. They worry that other people are judging or criticizing them for how they look, what they say, or how they act.  Social anxiety disorder is more than just occasional shyness or self-consciousness. It can cause severe emotional distress. It can interfere with daily life activities. Social anxiety disorder also may lead to alcohol or drug use and even suicide.  Social anxiety disorder is a common mental disorder. It can develop at any time, but it usually starts in the teenage years.  What are the causes?  The cause of this condition is not known. It may involve genes that are passed through families. Stressful events may trigger anxiety.  What increases the risk?  This condition is more likely to develop in:  · People  who have a family history of anxiety disorders.  · Women.  · People who have a condition that makes them feel self-conscious or nervous, such as a stutter or a chronic disease.  What are the signs or symptoms?  The main symptom of this condition is fear of being criticized or judged in social situations. You may be afraid to:  · Speak in public.  · Go shopping.  · Use a public bathroom.  · Eat at a restaurant.  · Go to work.  · Interact with unfamiliar people.  Extreme fear and anxiety may cause physical symptoms, including:  · Blushing.  · Racing heart.  · Sweating.  · Shaky hands or voice.  · Confusion.  · Light-headedness.  · Upset stomach, diarrhea, or vomiting.  · Shortness of breath.  How is this diagnosed?  Your health care provider can diagnose this condition based on your history, symptoms, and behavior in social situations. Your health care provider may ask you about your use of alcohol or drugs, including prescription medicines. Your health care provider may refer you to a mental health specialist for further evaluation or treatment.  How is this treated?  Treatment for this condition may include:  · Cognitive behavioral therapy. This type of talk therapy helps you learn to replace negative thoughts and behaviors with positive ones. This may include learning better coping skills and ways to control anxiety.  · Exposure therapy. You will be exposed to social situations that cause fear. The treatment starts with situations that you can manage. Over time, you will learn to manage harder situations.  · Antidepressant medicines. These medicines may be used for a short time along with other therapies.  · Beta blockers. These medicines may help to control anxiety.  · Biofeedback. This process trains you to manage your body's response (physiological response) through breathing techniques and relaxation methods. You will work with a therapist while machines are used to monitor your physical symptoms.  · Relaxation  and coping techniques. These include deep breathing, self-talk, meditation, visual imagery, and yoga. Relaxation techniques help to keep you calm in social situations.  These treatments are often used in combination.  Follow these instructions at home:  · Take over-the-counter and prescription medicines only as told by your health care provider.  · Practice relaxation and coping strategies as taught by your health care provider.  · Return to social activities as suggested by your health care provider.  · Keep all follow-up visits as told by your health care provider. This is important.  Contact a health care provider if:  · Your symptoms do not improve.  · Your symptoms get worse.  · You have signs of depression, such as:  ¨ A persistently sad, cranky, or irritable mood.  ¨ Loss of enjoyment in activities that used to bring you alberto.  ¨ Change in weight or eating.  ¨ Changes in sleeping habits.  ¨ Avoiding friends or family members.  ¨ Loss of energy for normal tasks.  ¨ Feelings of guilt or worthlessness.  · You become very isolated.  · You find it very hard to speak or interact with others.  · You are using drugs.  · You are drinking more alcohol than normal.  Get help right away if:  · You self-harm.  · You have suicidal thoughts.  If you ever feel like you may hurt yourself or others, or have thoughts about taking your own life, get help right away. You can go to your nearest emergency department or call:  · Your local emergency services (911 in the [place]  · A suicide crisis helpline, such as the National Suicide Prevention Lifeline at 1-381.329.4688. This is open 24 hours a day.  Summary  · Social anxiety disorder may cause you to feel nervous, afraid, or embarrassed when you are around other people in social situations.  · Social anxiety disorder is a common mental disorder. It can develop at any time, but it usually starts in the teenage years.  · Treatment includes talk therapy, exposure therapy, medicines,  biofeedback, relaxation techniques, or a combination of two or more treatments.  This information is not intended to replace advice given to you by your health care provider. Make sure you discuss any questions you have with your health care provider.  Document Released: 11/16/2006 Document Revised: 11/10/2017 Document Reviewed: 11/10/2017  [employer] Interactive Patient Education © 2017 [employer] Inc.

## 2018-03-06 NOTE — PROGRESS NOTES
Subjective   Radha Van is a 47 y.o. female     Chief Complaint   Patient presents with   • Coronary Artery Disease     presents as a follow up       HPI    Problem List:    1. CAD  1.1. LHC 1/7/16 - Stent LAD  2. Hypertension  3. Dyspnea  3.1 Echo 11/2/15 - mild LVH; EF > 65%; DD I; trace MR & TR; mild TN; PA 20-25  4. Edema  5. Anxiety        6. Dyslipidemia     Patient is a 47-year-old female who presents today for follow-up.  She denies any chest pain, pressure, palpitations, fluttering, presyncope, syncope, orthopnea or PND.  She says she occasionally gets dizzy if she gets up too quickly on days that she is nervous.  She says she also notices when she's nervous she fell shaky all over and will have to take deep breaths.  She does get some swelling in her ankles every now and then when she's on her feet all day.  She says again she is only short of breath whenever she is anxious and feels nervous inside.  She has not had any recent lab work.  Patient has a 16-year-old son and 18-year-old son that was wanting to the [facility] for basketball.    Current Outpatient Prescriptions   Medication Sig Dispense Refill   • aspirin (ASPIRIN LOW DOSE) 81 MG EC tablet Take 81 mg by mouth Daily.     • atorvastatin (LIPITOR) 40 MG tablet Take 1 tablet by mouth Every Evening. 90 tablet 3   • buPROPion XL (WELLBUTRIN XL) 300 MG 24 hr tablet Take 1 tablet by mouth Daily. 90 tablet 3   • clopidogrel (PLAVIX) 75 MG tablet take 1 tablet by mouth once daily 90 tablet 3   • furosemide (LASIX) 20 MG tablet Take 1 tablet by mouth Daily. MAY TAKE AN ADDITIONAL DOSE ON THE DAYS THAT THE SWELLING IS INCREASED. 90 tablet 3   • medroxyPROGESTERone (PROVERA) 10 MG tablet Take 10 mg by mouth Daily. Patient takes from 16-25 of the month  0   • nitroglycerin (NITROSTAT) 0.4 MG SL tablet Place 1 tablet under the tongue Every 5 (Five) Minutes As Needed for Chest Pain. 30 tablet 5   • potassium chloride (KLOR-CON) 10 MEQ CR tablet  Take 1 tablet by mouth Daily. MAY TAKE AN ADDITIONAL DOSE ON THE DAYS THAT THE SWELLING IS INCREASED WITH LASIX 90 tablet 3     No current facility-administered medications for this visit.        ALLERGIES    Azithromycin    Past Medical History:   Diagnosis Date   • Ankle fracture, right     She does have edema in her RLE due to cracked bone in her right ankle. Patient says she fell down stairs. She is suppose to have a boot on, but because of the weather she wore a sneaker. Patient says that she had just started to exercise again when this happened. Foot has been painful today.   • Anxiety    • CAD in native artery     1.1. LHC 1/7/16 - Stent LAD   • Chest pain    • Diverticulitis    • Dyspnea     3.1 Echo 11/2/15 - mild LVH; EF > 65%; DD I; trace MR & TR; mild SD; PA 20-25   • Edema     Related to ankle fracture.   • Hypertension     The patient presents for follow-up of primary hypertension. The patient states she has been stable with her blood pressure control since the last visit. Comorbid Illnesses: coronary artery disease.    • Obesity     BMI 31.95.       Social History     Social History   • Marital status:      Spouse name: N/A   • Number of children: N/A   • Years of education: N/A     Occupational History   • Not on file.     Social History Main Topics   • Smoking status: Never Smoker   • Smokeless tobacco: Never Used   • Alcohol use No   • Drug use: No   • Sexual activity: Not on file     Other Topics Concern   • Not on file     Social History Narrative       Family History   Problem Relation Age of Onset   • Colon cancer Other    • Stroke Other      Total of 3   • Hypertension Other    • Heart attack Other    • Other Other      High cholesterol & Heart Surgery   • Lupus Other      Systemic lupus erythematosus       Review of Systems   Constitutional: Positive for fatigue. Negative for diaphoresis.   HENT: Positive for rhinorrhea, sinus pressure and sneezing.    Eyes: Positive for visual  "disturbance (wears reading glasses ).   Respiratory: Positive for shortness of breath (when she is anxious, will feel nervous inside ). Negative for chest tightness.    Cardiovascular: Positive for leg swelling (every now and then again in ankles if on feet all day ). Negative for chest pain and palpitations.   Gastrointestinal: Negative for constipation, diarrhea, nausea and vomiting.   Endocrine: Negative.    Genitourinary: Positive for frequency. Negative for difficulty urinating.   Musculoskeletal: Negative for arthralgias, back pain, myalgias and neck pain.   Skin: Negative.    Allergic/Immunologic: Positive for environmental allergies.   Neurological: Positive for dizziness (occasionally when she gets up too quick, nervous days ) and headaches ( sinus). Negative for syncope and light-headedness.   Hematological: Does not bruise/bleed easily.   Psychiatric/Behavioral: The patient is nervous/anxious.        Objective   /77 (BP Location: Left arm, Patient Position: Sitting)  Pulse 78  Ht 170.2 cm (67\")  Wt 95.3 kg (210 lb)  SpO2 96%  BMI 32.89 kg/m2  Vitals:    03/06/18 1034   BP: 117/77   BP Location: Left arm   Patient Position: Sitting   Pulse: 78   SpO2: 96%   Weight: 95.3 kg (210 lb)   Height: 170.2 cm (67\")      Lab Results (most recent)     None        Physical Exam   Constitutional: She is oriented to person, place, and time. Vital signs are normal. She appears well-developed and well-nourished. She is active and cooperative.   HENT:   Head: Normocephalic.   Eyes: Lids are normal.   Neck: Normal carotid pulses, no hepatojugular reflux and no JVD present. Carotid bruit is not present.   Cardiovascular: Normal rate, regular rhythm and normal heart sounds.    Pulses:       Radial pulses are 2+ on the right side, and 2+ on the left side.        Dorsalis pedis pulses are 2+ on the right side, and 2+ on the left side.        Posterior tibial pulses are 2+ on the right side, and 2+ on the left side. "   No edema BLE.    Pulmonary/Chest: Effort normal and breath sounds normal.   Abdominal: Normal appearance and bowel sounds are normal.   Neurological: She is alert and oriented to person, place, and time.   Skin: Skin is warm, dry and intact.   Psychiatric: She has a normal mood and affect. Her speech is normal and behavior is normal. Judgment and thought content normal. Cognition and memory are normal.       Procedure   Procedures         Assessment/Plan      Diagnosis Plan   1. CAD in native artery  Lipid Panel    Lipid Panel   2. Essential hypertension  TSH    Comprehensive Metabolic Panel    TSH    Comprehensive Metabolic Panel   3. Hyperlipidemia, unspecified hyperlipidemia type  Lipid Panel    Lipid Panel   4. Healthcare maintenance  TSH    Comprehensive Metabolic Panel    Lipid Panel    TSH    Comprehensive Metabolic Panel    Lipid Panel   5. Shortness of breath         Return in about 6 months (around 9/6/2018).  CAD-patient is on aspirin and statin.  Hypertension-patient doing well.  Hyperlipidemia-patient is on Lipitor.  Shortness of breath-stable.  She will continue her medication regimen.  She will get lipids, CMP and TSH.  She will follow-up in 6 months or sooner if any changes.           Patient's BMI is above normal parameters. Follow-up plan includes:  educational material.      Electronically signed by:

## 2018-03-07 LAB
ALBUMIN SERPL-MCNC: 4.6 G/DL (ref 3.5–5)
ALBUMIN/GLOB SERPL: 1.6 G/DL (ref 1.5–2.5)
ALP SERPL-CCNC: 67 U/L (ref 35–104)
ALT SERPL W P-5'-P-CCNC: 31 U/L (ref 10–36)
ANION GAP SERPL CALCULATED.3IONS-SCNC: 8.9 MMOL/L (ref 3.6–11.2)
AST SERPL-CCNC: 21 U/L (ref 10–30)
BILIRUB SERPL-MCNC: 0.6 MG/DL (ref 0.2–1.8)
BUN BLD-MCNC: 13 MG/DL (ref 7–21)
BUN/CREAT SERPL: 14.1 (ref 7–25)
CALCIUM SPEC-SCNC: 9.1 MG/DL (ref 7.7–10)
CHLORIDE SERPL-SCNC: 106 MMOL/L (ref 99–112)
CHOLEST SERPL-MCNC: 159 MG/DL (ref 0–200)
CO2 SERPL-SCNC: 25.1 MMOL/L (ref 24.3–31.9)
CREAT BLD-MCNC: 0.92 MG/DL (ref 0.43–1.29)
GFR SERPL CREATININE-BSD FRML MDRD: 65 ML/MIN/1.73
GLOBULIN UR ELPH-MCNC: 2.8 GM/DL
GLUCOSE BLD-MCNC: 93 MG/DL (ref 70–110)
HDLC SERPL-MCNC: 52 MG/DL (ref 60–100)
LDLC SERPL CALC-MCNC: 75 MG/DL (ref 0–100)
LDLC/HDLC SERPL: 1.45 {RATIO}
OSMOLALITY SERPL CALC.SUM OF ELEC: 279.2 MOSM/KG (ref 273–305)
POTASSIUM BLD-SCNC: 4.1 MMOL/L (ref 3.5–5.3)
PROT SERPL-MCNC: 7.4 G/DL (ref 6–8)
SODIUM BLD-SCNC: 140 MMOL/L (ref 135–153)
TRIGL SERPL-MCNC: 158 MG/DL (ref 0–150)
TSH SERPL DL<=0.05 MIU/L-ACNC: 1.38 MIU/ML (ref 0.55–4.78)
VLDLC SERPL-MCNC: 31.6 MG/DL

## 2018-03-09 ENCOUNTER — TELEPHONE (OUTPATIENT)
Dept: CARDIOLOGY | Facility: CLINIC | Age: 48
End: 2018-03-09

## 2018-03-09 NOTE — TELEPHONE ENCOUNTER
----- Message from DEVI Centeno sent at 3/7/2018 12:46 PM EST -----  Please advise patient of lab results.

## 2018-04-12 DIAGNOSIS — E78.5 DYSLIPIDEMIA: ICD-10-CM

## 2018-04-12 DIAGNOSIS — F41.9 ANXIETY: Chronic | ICD-10-CM

## 2018-04-12 DIAGNOSIS — I25.119 CORONARY ARTERY DISEASE INVOLVING NATIVE CORONARY ARTERY WITH ANGINA PECTORIS, UNSPECIFIED WHETHER NATIVE OR TRANSPLANTED HEART (HCC): ICD-10-CM

## 2018-04-12 RX ORDER — ATORVASTATIN CALCIUM 40 MG/1
40 TABLET, FILM COATED ORAL EVERY EVENING
Qty: 90 TABLET | Refills: 3 | Status: SHIPPED | OUTPATIENT
Start: 2018-04-12 | End: 2019-06-05 | Stop reason: SDUPTHER

## 2018-04-12 RX ORDER — BUPROPION HYDROCHLORIDE 300 MG/1
TABLET ORAL
Qty: 90 TABLET | Refills: 3 | Status: SHIPPED | OUTPATIENT
Start: 2018-04-12 | End: 2019-06-16 | Stop reason: SDUPTHER

## 2018-05-17 DIAGNOSIS — R60.9 PERIPHERAL EDEMA: ICD-10-CM

## 2018-05-17 RX ORDER — POTASSIUM CHLORIDE 750 MG/1
10 TABLET, EXTENDED RELEASE ORAL DAILY
Qty: 90 TABLET | Refills: 3 | Status: SHIPPED | OUTPATIENT
Start: 2018-05-17 | End: 2019-06-03 | Stop reason: SDUPTHER

## 2018-05-24 DIAGNOSIS — R60.9 PERIPHERAL EDEMA: ICD-10-CM

## 2018-05-25 RX ORDER — FUROSEMIDE 20 MG/1
TABLET ORAL
Qty: 90 TABLET | Refills: 3 | Status: SHIPPED | OUTPATIENT
Start: 2018-05-25 | End: 2019-01-09

## 2018-09-05 ENCOUNTER — OFFICE VISIT (OUTPATIENT)
Dept: CARDIOLOGY | Facility: CLINIC | Age: 48
End: 2018-09-05

## 2018-09-05 VITALS
SYSTOLIC BLOOD PRESSURE: 121 MMHG | HEART RATE: 85 BPM | BODY MASS INDEX: 33.62 KG/M2 | WEIGHT: 214.2 LBS | HEIGHT: 67 IN | DIASTOLIC BLOOD PRESSURE: 86 MMHG | OXYGEN SATURATION: 94 %

## 2018-09-05 DIAGNOSIS — E78.5 HYPERLIPIDEMIA, UNSPECIFIED HYPERLIPIDEMIA TYPE: ICD-10-CM

## 2018-09-05 DIAGNOSIS — R06.02 SHORTNESS OF BREATH: ICD-10-CM

## 2018-09-05 DIAGNOSIS — I25.10 CAD IN NATIVE ARTERY: Primary | Chronic | ICD-10-CM

## 2018-09-05 DIAGNOSIS — I10 ESSENTIAL HYPERTENSION: ICD-10-CM

## 2018-09-05 PROCEDURE — 93000 ELECTROCARDIOGRAM COMPLETE: CPT | Performed by: NURSE PRACTITIONER

## 2018-09-05 PROCEDURE — 99213 OFFICE O/P EST LOW 20 MIN: CPT | Performed by: NURSE PRACTITIONER

## 2018-09-05 NOTE — PATIENT INSTRUCTIONS
MyPlate from [employer]  The general, healthful diet is based on the 2010 Dietary Guidelines for Americans. The amount of food you need to eat from each food group depends on your age, sex, and level of physical activity and can be individualized by a dietitian. Go to ChooseMyPlate.gov for more information.  What do I need to know about the MyPlate plan?  · Enjoy your food, but eat less.  · Avoid oversized portions.  ? ½ of your plate should include fruits and vegetables.  ? ¼ of your plate should be grains.  ? ¼ of your plate should be protein.  Grains  · Make at least half of your grains whole grains.  · For a 2,000 calorie daily food plan, eat 6 oz every day.  · 1 oz is about 1 slice bread, 1 cup cereal, or ½ cup cooked rice, cereal, or pasta.  Vegetables  · Make half your plate fruits and vegetables.  · For a 2,000 calorie daily food plan, eat 2½ cups every day.  · 1 cup is about 1 cup raw or cooked vegetables or vegetable juice or 2 cups raw leafy greens.  Fruits  · Make half your plate fruits and vegetables.  · For a 2,000 calorie daily food plan, eat 2 cups every day.  · 1 cup is about 1 cup fruit or 100% fruit juice or ½ cup dried fruit.  Protein  · For a 2,000 calorie daily food plan, eat 5½ oz every day.  · 1 oz is about 1 oz meat, poultry, or fish, ¼ cup cooked beans, 1 egg, 1 Tbsp peanut butter, or ½ oz nuts or seeds.  Dairy  · Switch to fat-free or low-fat (1%) milk.  · For a 2,000 calorie daily food plan, eat 3 cups every day.  · 1 cup is about 1 cup milk or yogurt or soy milk (soy beverage), 1½ oz natural cheese, or 2 oz processed cheese.  Fats, Oils, and Empty Calories  · Only small amounts of oils are recommended.  · Empty calories are calories from solid fats or added sugars.  · Compare sodium in foods like soup, bread, and frozen meals. Choose the foods with lower numbers.  · Drink water instead of sugary drinks.  What foods can I eat?  Grains  Whole grains such as whole wheat, quinoa, millet, and  bulgur. Bread, rolls, and pasta made from whole grains. Brown or wild rice. Hot or cold cereals made from whole grains and without added sugar.  Vegetables  All fresh vegetables, especially fresh red, dark green, or orange vegetables. Peas and beans. Low-sodium frozen or canned vegetables prepared without added salt. Low-sodium vegetable juices.  Fruits  All fresh, frozen, and dried fruits. Canned fruit packed in water or fruit juice without added sugar. Fruit juices without added sugar.  Meats and Other Protein Sources  Boiled, baked, or grilled lean meat trimmed of fat. Skinless poultry. Fresh seafood and shellfish. Canned seafood packed in water. Unsalted nuts and unsalted nut butters. Tofu. Dried beans and pea. Eggs.  Dairy  Low-fat or fat-free milk, yogurt, and cheeses.  Sweets and Desserts  Frozen desserts made from low-fat milk.  Fats and Oils  Olive, peanut, and canola oils and margarine. Salad dressing and mayonnaise made from these oils.  Other  Soups and casseroles made from allowed ingredients and without added fat or salt.  The items listed above may not be a complete list of recommended foods or beverages. Contact your dietitian for more options.  What foods are not recommended?  Grains  Sweetened, low-fiber cereals. Packaged baked goods. Snack crackers and chips. Cheese crackers, butter crackers, and biscuits. Frozen waffles, sweet breads, doughnuts, pastries, packaged baking mixes, pancakes, cakes, and cookies.  Vegetables  Regular canned or frozen vegetables or vegetables prepared with salt. Canned tomatoes. Canned tomato sauce. Fried vegetables. Vegetables in cream sauce or cheese sauce.  Fruits  Fruits packed in syrup or made with added sugar.  Meats and Other Protein Sources  Marbled or fatty meats such as ribs. Poultry with skin. Fried meats, poultry, eggs, or fish. Sausages, hot dogs, and deli meats such as pastrami, bologna, or salami.  Dairy  Whole milk, cream, cheeses made from whole milk,  sour cream. Ice cream or yogurt made from whole milk or with added sugar.  Beverages  For adults, no more than one alcoholic drink per day. Regular soft drinks or other sugary beverages. Juice drinks.  Sweets and Desserts  Sugary or fatty desserts, candy, and other sweets.  Fats and Oils  Solid shortening or partially hydrogenated oils. Solid margarine. Margarine that contains trans fats. Butter.  The items listed above may not be a complete list of foods and beverages to avoid. Contact your dietitian for more information.  This information is not intended to replace advice given to you by your health care provider. Make sure you discuss any questions you have with your health care provider.  Document Released: 01/06/2009 Document Revised: 05/25/2017 Document Reviewed: 11/26/2014  [employer]vier Interactive Patient Education © 2018 [employer]

## 2018-09-05 NOTE — PROGRESS NOTES
Subjective   Radha Van is a 48 y.o. female     Chief Complaint   Patient presents with   • Coronary Artery Disease     presents as a follow up   • Hypertension   • Hyperlipidemia       HPI    Problem List:    1. CAD  1.1. LHC 1/7/16 - Stent LAD  2. Hypertension  3. Dyspnea  3.1 Echo 11/2/15 - mild LVH; EF > 65%; DD I; trace MR & TR; mild KS; PA 20-25  4. Edema  5. Anxiety        6. Dyslipidemia     Patient is a 48-year-old female who presents today for follow-up.  She denies any chest pain or pressure.  She says her heart will race when she is out of breath.  She does get dizzy but she says she's only been that way a couple of times with position change.  She says she does get a headache in the back of her neck as well as the top of her head.  She says when this happens her blood pressure is usually only about 140 systolic.  She denies any presyncope, syncope, orthopnea or PND.  She says she still get some swelling with her water pills and she also notices swelling in her eyes and in her hands.  She says she gets short of breath with more than normal activity.  Patient also indicated that her father was just diagnosed with diabetes.  Patient says when she had her previous stent she had jaw pain only.  She says she has had some pain in left side of her neck when she walks at times.  She says this is not all the time and again whenever she had her prior stent her pain was in her jaw and it was with activity if she walks on a treadmill or walk a distance she would have it and when she stopped her go away.    Current Outpatient Prescriptions   Medication Sig Dispense Refill   • aspirin (ASPIRIN LOW DOSE) 81 MG EC tablet Take 81 mg by mouth Daily.     • atorvastatin (LIPITOR) 40 MG tablet take 1 tablet by mouth EVERY EVENING 90 tablet 3   • buPROPion XL (WELLBUTRIN XL) 300 MG 24 hr tablet take 1 tablet by mouth once daily 90 tablet 3   • clopidogrel (PLAVIX) 75 MG tablet take 1 tablet by mouth once daily 90 tablet  3   • furosemide (LASIX) 20 MG tablet Take 1 tablet by mouth Daily. MAY TAKE AN ADDITIONAL DOSE ON THE DAYS THAT THE SWELLING IS INCREASED. 90 tablet 3   • furosemide (LASIX) 20 MG tablet take 1 tablet by mouth once daily AND MAY TAKE AN ADDITIONAL 90 tablet 3   • nitroglycerin (NITROSTAT) 0.4 MG SL tablet Place 1 tablet under the tongue Every 5 (Five) Minutes As Needed for Chest Pain. 30 tablet 5   • potassium chloride (KLOR-CON) 10 MEQ CR tablet Take 1 tablet by mouth Daily. MAY TAKE AN ADDITIONAL DOSE ON THE DAYS THAT THE SWELLING IS INCREASED WITH LASIX 90 tablet 3     No current facility-administered medications for this visit.        ALLERGIES    Azithromycin    Past Medical History:   Diagnosis Date   • Ankle fracture, right     She does have edema in her RLE due to cracked bone in her right ankle. Patient says she fell down stairs. She is suppose to have a boot on, but because of the weather she wore a sneaker. Patient says that she had just started to exercise again when this happened. Foot has been painful today.   • Anxiety    • CAD in native artery     1.1. LHC 1/7/16 - Stent LAD   • Chest pain    • Diverticulitis    • Dyspnea     3.1 Echo 11/2/15 - mild LVH; EF > 65%; DD I; trace MR & TR; mild KS; PA 20-25   • Edema     Related to ankle fracture.   • Hypertension     The patient presents for follow-up of primary hypertension. The patient states she has been stable with her blood pressure control since the last visit. Comorbid Illnesses: coronary artery disease.    • Obesity     BMI 31.95.       Social History     Social History   • Marital status:      Spouse name: N/A   • Number of children: N/A   • Years of education: N/A     Occupational History   • Not on file.     Social History Main Topics   • Smoking status: Never Smoker   • Smokeless tobacco: Never Used   • Alcohol use No   • Drug use: No   • Sexual activity: Not on file     Other Topics Concern   • Not on file     Social History  "Narrative   • No narrative on file       Family History   Problem Relation Age of Onset   • Colon cancer Other    • Stroke Other         Total of 3   • Hypertension Other    • Heart attack Other    • Other Other         High cholesterol & Heart Surgery   • Lupus Other         Systemic lupus erythematosus       Review of Systems   Constitutional: Positive for fatigue. Negative for diaphoresis.   HENT: Positive for sinus pain, sinus pressure and sneezing. Negative for congestion, postnasal drip and rhinorrhea.    Eyes: Positive for visual disturbance (reading glasses ).   Respiratory: Positive for shortness of breath (with more than normal activity ). Negative for chest tightness.    Cardiovascular: Positive for palpitations (heart races when out of breath ) and leg swelling (still get some with water pill, some under eyes and hands ). Negative for chest pain.   Gastrointestinal: Positive for constipation (depends ), diarrhea (depends ) and nausea (quite a bit ). Negative for vomiting.   Endocrine: Negative.    Genitourinary: Positive for frequency. Negative for difficulty urinating.   Musculoskeletal: Positive for neck pain (left side of neck when she walks at times, not all of the time and not a low, when she had prior stent had jaw pain everytime she was on treadmill or would walk distance it would do it). Negative for arthralgias and back pain.   Allergic/Immunologic: Positive for environmental allergies.   Neurological: Positive for dizziness (just a couple times) and headaches (top of head down back of neck; cluster headaches ). Negative for syncope and light-headedness.   Hematological: Bruises/bleeds easily.   Psychiatric/Behavioral: Negative.        Objective   /86 (BP Location: Left arm, Patient Position: Sitting)   Pulse 85   Ht 170.2 cm (67\")   Wt 97.2 kg (214 lb 3.2 oz)   SpO2 94%   BMI 33.55 kg/m²   Vitals:    09/05/18 1014   BP: 121/86   BP Location: Left arm   Patient Position: Sitting " "  Pulse: 85   SpO2: 94%   Weight: 97.2 kg (214 lb 3.2 oz)   Height: 170.2 cm (67\")      Lab Results (most recent)     None        Physical Exam   Constitutional: She is oriented to person, place, and time. Vital signs are normal. She appears well-developed and well-nourished. She is active and cooperative.   HENT:   Head: Normocephalic.   Eyes: Lids are normal.   Neck: Normal carotid pulses, no hepatojugular reflux and no JVD present. Carotid bruit is not present.   Cardiovascular: Normal rate, regular rhythm and normal heart sounds.    Pulses:       Radial pulses are 2+ on the right side, and 2+ on the left side.        Dorsalis pedis pulses are 2+ on the right side, and 2+ on the left side.        Posterior tibial pulses are 2+ on the right side, and 2+ on the left side.   No edema BLE.   Pulmonary/Chest: Effort normal and breath sounds normal.   Abdominal: Normal appearance and bowel sounds are normal.   Neurological: She is alert and oriented to person, place, and time.   Skin: Skin is warm, dry and intact.   Psychiatric: She has a normal mood and affect. Her speech is normal and behavior is normal. Judgment and thought content normal. Cognition and memory are normal.       Procedure     ECG 12 Lead  Date/Time: 9/5/2018 10:22 AM  Performed by: MARA HERNANDEZ  Authorized by: MARA HERNANDEZ   Comparison: compared with previous ECG from 8/9/2017  Similar to previous ECG  Rhythm: sinus rhythm  Rate: normal  BPM: 77  QRS axis: normal  Clinical impression: non-specific ECG and low voltage                 Assessment/Plan      Diagnosis Plan   1. CAD in native artery  ECG 12 Lead    Stress Test With Myocardial Perfusion One Day    Adult Transthoracic Echo Complete W/ Cont if Necessary Per Protocol   2. Hyperlipidemia, unspecified hyperlipidemia type  Stress Test With Myocardial Perfusion One Day    Adult Transthoracic Echo Complete W/ Cont if Necessary Per Protocol   3. Essential hypertension  ECG 12 Lead    Stress " Test With Myocardial Perfusion One Day    Adult Transthoracic Echo Complete W/ Cont if Necessary Per Protocol   4. Shortness of breath  Stress Test With Myocardial Perfusion One Day    Adult Transthoracic Echo Complete W/ Cont if Necessary Per Protocol       Return in about 8 weeks (around 10/31/2018).    CAD/hyperlipidemia/hypertension/shortness of breath-patient will have ischemia workup, stress and echo.  She will continue her medication regimen.  She will follow-up in 8 weeks or sooner if any changes.  We will repeat her labs at her next 6 month follow-up and we'll get hemoglobin A1c due to the fact that her father and grandparents are diabetics.       Patient's Body mass index is 33.55 kg/m². BMI is above normal parameters. Recommendations include: educational material.      Electronically signed by:

## 2018-10-09 ENCOUNTER — HOSPITAL ENCOUNTER (OUTPATIENT)
Dept: CARDIOLOGY | Facility: HOSPITAL | Age: 48
Discharge: HOME | End: 2018-10-09

## 2018-10-09 ENCOUNTER — OUTSIDE FACILITY SERVICE (OUTPATIENT)
Dept: CARDIOLOGY | Facility: CLINIC | Age: 48
End: 2018-10-09

## 2018-10-09 LAB
MAXIMAL PREDICTED HEART RATE: 172 BPM
STRESS TARGET HR: 146 BPM

## 2018-10-09 PROCEDURE — 78452 HT MUSCLE IMAGE SPECT MULT: CPT | Performed by: INTERNAL MEDICINE

## 2018-10-09 PROCEDURE — 93306 TTE W/DOPPLER COMPLETE: CPT | Performed by: INTERNAL MEDICINE

## 2018-10-09 PROCEDURE — 93017 CV STRESS TEST TRACING ONLY: CPT

## 2018-10-09 PROCEDURE — 78452 HT MUSCLE IMAGE SPECT MULT: CPT

## 2018-10-09 PROCEDURE — A9500 TC99M SESTAMIBI: HCPCS | Performed by: INTERNAL MEDICINE

## 2018-10-09 PROCEDURE — 0 TECHNETIUM SESTAMIBI: Performed by: INTERNAL MEDICINE

## 2018-10-09 PROCEDURE — 93306 TTE W/DOPPLER COMPLETE: CPT

## 2018-10-09 PROCEDURE — 93018 CV STRESS TEST I&R ONLY: CPT | Performed by: INTERNAL MEDICINE

## 2018-10-09 RX ADMIN — TECHNETIUM TC 99M SESTAMIBI 1 DOSE: 1 INJECTION INTRAVENOUS at 08:08

## 2018-10-09 RX ADMIN — TECHNETIUM TC 99M SESTAMIBI 1 DOSE: 1 INJECTION INTRAVENOUS at 08:07

## 2018-10-14 LAB
BH CV ECHO MEAS - ACS: 1.8 CM
BH CV ECHO MEAS - AO MAX PG: 3.5 MMHG
BH CV ECHO MEAS - AO MEAN PG: 2.2 MMHG
BH CV ECHO MEAS - AO ROOT AREA (BSA CORRECTED): 1.6
BH CV ECHO MEAS - AO ROOT AREA: 8.7 CM^2
BH CV ECHO MEAS - AO ROOT DIAM: 3.3 CM
BH CV ECHO MEAS - AO V2 MAX: 93.5 CM/SEC
BH CV ECHO MEAS - AO V2 MEAN: 70.2 CM/SEC
BH CV ECHO MEAS - AO V2 VTI: 18 CM
BH CV ECHO MEAS - BSA(HAYCOCK): 2.2 M^2
BH CV ECHO MEAS - BSA: 2.1 M^2
BH CV ECHO MEAS - BZI_BMI: 33.5 KILOGRAMS/M^2
BH CV ECHO MEAS - BZI_METRIC_HEIGHT: 170.2 CM
BH CV ECHO MEAS - BZI_METRIC_WEIGHT: 97.1 KG
BH CV ECHO MEAS - EDV(CUBED): 72.6 ML
BH CV ECHO MEAS - EDV(TEICH): 77.4 ML
BH CV ECHO MEAS - EF(CUBED): 74.7 %
BH CV ECHO MEAS - EF(TEICH): 67 %
BH CV ECHO MEAS - ESV(CUBED): 18.4 ML
BH CV ECHO MEAS - ESV(TEICH): 25.5 ML
BH CV ECHO MEAS - FS: 36.8 %
BH CV ECHO MEAS - IVS/LVPW: 1.2
BH CV ECHO MEAS - IVSD: 1.1 CM
BH CV ECHO MEAS - LA DIMENSION: 3.7 CM
BH CV ECHO MEAS - LA/AO: 1.1
BH CV ECHO MEAS - LV IVRT: 0.11 SEC
BH CV ECHO MEAS - LV MASS(C)D: 136.4 GRAMS
BH CV ECHO MEAS - LV MASS(C)DI: 65.6 GRAMS/M^2
BH CV ECHO MEAS - LVIDD: 4.2 CM
BH CV ECHO MEAS - LVIDS: 2.6 CM
BH CV ECHO MEAS - LVPWD: 0.9 CM
BH CV ECHO MEAS - MITRAL HR: 170 BPM
BH CV ECHO MEAS - MITRAL R-R: 0.35 SEC
BH CV ECHO MEAS - MV A MAX VEL: 46.8 CM/SEC
BH CV ECHO MEAS - MV DEC SLOPE: 563.4 CM/SEC^2
BH CV ECHO MEAS - MV DEC TIME: 0.14 SEC
BH CV ECHO MEAS - MV E MAX VEL: 78 CM/SEC
BH CV ECHO MEAS - MV E/A: 1.7
BH CV ECHO MEAS - MV MAX PG: 3 MMHG
BH CV ECHO MEAS - MV MEAN PG: 1.7 MMHG
BH CV ECHO MEAS - MV V2 MAX: 87.1 CM/SEC
BH CV ECHO MEAS - MV V2 MEAN: 62.9 CM/SEC
BH CV ECHO MEAS - MV V2 VTI: 22.2 CM
BH CV ECHO MEAS - PA MAX PG: 3.3 MMHG
BH CV ECHO MEAS - PA MEAN PG: 1.9 MMHG
BH CV ECHO MEAS - PA V2 MAX: 90.8 CM/SEC
BH CV ECHO MEAS - PA V2 MEAN: 65.7 CM/SEC
BH CV ECHO MEAS - PA V2 VTI: 18 CM
BH CV ECHO MEAS - PULM. HR: 219.5 BPM
BH CV ECHO MEAS - PULM. R-R: 0.27 SEC
BH CV ECHO MEAS - RAP SYSTOLE: 10 MMHG
BH CV ECHO MEAS - RVDD: 3 CM
BH CV ECHO MEAS - RVSP: 18.2 MMHG
BH CV ECHO MEAS - SI(AO): 75 ML/M^2
BH CV ECHO MEAS - SI(CUBED): 26.1 ML/M^2
BH CV ECHO MEAS - SI(TEICH): 24.9 ML/M^2
BH CV ECHO MEAS - SV(AO): 156 ML
BH CV ECHO MEAS - SV(CUBED): 54.3 ML
BH CV ECHO MEAS - SV(TEICH): 51.9 ML
BH CV ECHO MEAS - TR MAX VEL: 142.4 CM/SEC

## 2018-10-16 ENCOUNTER — TELEPHONE (OUTPATIENT)
Dept: CARDIOLOGY | Facility: CLINIC | Age: 48
End: 2018-10-16

## 2018-10-16 NOTE — TELEPHONE ENCOUNTER
Patient is aware of echo results. Patient verbalized understanding and was encouraged to keep follow up. BENITOF,TARIQ

## 2019-01-09 ENCOUNTER — OFFICE VISIT (OUTPATIENT)
Dept: CARDIOLOGY | Facility: CLINIC | Age: 49
End: 2019-01-09

## 2019-01-09 VITALS
SYSTOLIC BLOOD PRESSURE: 137 MMHG | WEIGHT: 211.1 LBS | BODY MASS INDEX: 33.13 KG/M2 | DIASTOLIC BLOOD PRESSURE: 88 MMHG | OXYGEN SATURATION: 100 % | HEART RATE: 84 BPM | HEIGHT: 67 IN

## 2019-01-09 DIAGNOSIS — R06.02 SHORTNESS OF BREATH: ICD-10-CM

## 2019-01-09 DIAGNOSIS — E78.5 HYPERLIPIDEMIA, UNSPECIFIED HYPERLIPIDEMIA TYPE: ICD-10-CM

## 2019-01-09 DIAGNOSIS — I25.10 CAD IN NATIVE ARTERY: Primary | Chronic | ICD-10-CM

## 2019-01-09 DIAGNOSIS — Z83.3 FAMILY HISTORY OF DIABETES MELLITUS IN FATHER: ICD-10-CM

## 2019-01-09 DIAGNOSIS — Z00.00 HEALTHCARE MAINTENANCE: ICD-10-CM

## 2019-01-09 DIAGNOSIS — R53.83 MALAISE AND FATIGUE: ICD-10-CM

## 2019-01-09 DIAGNOSIS — I10 ESSENTIAL HYPERTENSION: ICD-10-CM

## 2019-01-09 DIAGNOSIS — R53.81 MALAISE AND FATIGUE: ICD-10-CM

## 2019-01-09 PROCEDURE — 99213 OFFICE O/P EST LOW 20 MIN: CPT | Performed by: NURSE PRACTITIONER

## 2019-01-09 NOTE — PATIENT INSTRUCTIONS
"Fat and Cholesterol Restricted Diet  Getting too much fat and cholesterol in your diet may cause health problems. Following this diet helps keep your fat and cholesterol at normal levels. This can keep you from getting sick.  What types of fat should I choose?  · Choose monosaturated and polyunsaturated fats. These are found in foods such as olive oil, canola oil, flaxseeds, walnuts, almonds, and seeds.  · Eat more omega-3 fats. Good choices include salmon, mackerel, sardines, tuna, flaxseed oil, and ground flaxseeds.  · Limit saturated fats. These are in animal products such as meats, butter, and cream. They can also be in plant products such as palm oil, palm kernel oil, and coconut oil.  · Avoid foods with partially hydrogenated oils in them. These contain trans fats. Examples of foods that have trans fats are stick margarine, some tub margarines, cookies, crackers, and other baked goods.  What general guidelines do I need to follow?  · Check food labels. Look for the words \"trans fat\" and \"saturated fat.\"  · When preparing a meal:  ? Fill half of your plate with vegetables and green salads.  ? Fill one fourth of your plate with whole grains. Look for the word \"whole\" as the first word in the ingredient list.  ? Fill one fourth of your plate with lean protein foods.  · Eat more foods that have fiber, like apples, carrots, beans, peas, and barley.  · Eat more home-cooked foods. Eat less at restaurants and buffets.  · Limit or avoid alcohol.  · Limit foods high in starch and sugar.  · Limit fried foods.  · Cook foods without frying them. Baking, boiling, grilling, and broiling are all great options.  · Lose weight if you are overweight. Losing even a small amount of weight can help your overall health. It can also help prevent diseases such as diabetes and heart disease.  What foods can I eat?  Grains  Whole grains, such as whole wheat or whole grain breads, crackers, cereals, and pasta. Unsweetened oatmeal, " bulgur, barley, quinoa, or brown rice. Corn or whole wheat flour tortillas.  Vegetables  Fresh or frozen vegetables (raw, steamed, roasted, or grilled). Green salads.  Fruits  All fresh, canned (in natural juice), or frozen fruits.  Meat and Other Protein Products  Ground beef (85% or leaner), grass-fed beef, or beef trimmed of fat. Skinless chicken or turkey. Ground chicken or turkey. Pork trimmed of fat. All fish and seafood. Eggs. Dried beans, peas, or lentils. Unsalted nuts or seeds. Unsalted canned or dry beans.  Dairy  Low-fat dairy products, such as skim or 1% milk, 2% or reduced-fat cheeses, low-fat ricotta or cottage cheese, or plain low-fat yogurt.  Fats and Oils  Tub margarines without trans fats. Light or reduced-fat mayonnaise and salad dressings. Avocado. Olive, canola, sesame, or safflower oils. Natural peanut or almond butter (choose ones without added sugar and oil).  The items listed above may not be a complete list of recommended foods or beverages. Contact your dietitian for more options.  What foods are not recommended?  Grains  White bread. White pasta. White rice. Cornbread. Bagels, pastries, and croissants. Crackers that contain trans fat.  Vegetables  White potatoes. Corn. Creamed or fried vegetables. Vegetables in a cheese sauce.  Fruits  Dried fruits. Canned fruit in light or heavy syrup. Fruit juice.  Meat and Other Protein Products  Fatty cuts of meat. Ribs, chicken wings, pineda, sausage, bologna, salami, chitterlings, fatback, hot dogs, bratwurst, and packaged luncheon meats. Liver and organ meats.  Dairy  Whole or 2% milk, cream, half-and-half, and cream cheese. Whole milk cheeses. Whole-fat or sweetened yogurt. Full-fat cheeses. Nondairy creamers and whipped toppings. Processed cheese, cheese spreads, or cheese curds.  Sweets and Desserts  Corn syrup, sugars, honey, and molasses. Candy. Jam and jelly. Syrup. Sweetened cereals. Cookies, pies, cakes, donuts, muffins, and ice  cream.  Fats and Oils  Butter, stick margarine, lard, shortening, ghee, or pineda fat. Coconut, palm kernel, or palm oils.  Beverages  Alcohol. Sweetened drinks (such as sodas, lemonade, and fruit drinks or punches).  The items listed above may not be a complete list of foods and beverages to avoid. Contact your dietitian for more information.  This information is not intended to replace advice given to you by your health care provider. Make sure you discuss any questions you have with your health care provider.  Document Released: 06/18/2013 Document Revised: 08/24/2017 Document Reviewed: 03/19/2015  [employer] Interactive Patient Education © 2018 [employer]  BMI for Adults  Body mass index (BMI) is a number that is calculated from a person's weight and height. In most adults, the number is used to find how much of an adult's weight is made up of fat. BMI is not as accurate as a direct measure of body fat.  How is BMI calculated?  BMI is calculated by dividing weight in kilograms by height in meters squared. It can also be calculated by dividing weight in pounds by height in inches squared, then multiplying the resulting number by 703. Charts are available to help you find your BMI quickly and easily without doing this calculation.  How is BMI interpreted?  Health care professionals use BMI charts to identify whether an adult is underweight, at a normal weight, or overweight based on the following guidelines:  · Underweight: BMI less than 18.5.  · Normal weight: BMI between 18.5 and 24.9.  · Overweight: BMI between 25 and 29.9.  · Obese: BMI of 30 and above.    BMI is usually interpreted the same for males and females.  Weight includes both fat and muscle, so someone with a muscular build, such as an athlete, may have a BMI that is higher than 24.9. In cases like these, BMI may not accurately depict body fat. To determine if excess body fat is the cause of a BMI of 25 or higher, further assessments may need to be  done by a health care provider.  Why is BMI a useful tool?  BMI is used to identify a possible weight problem that may be related to a medical problem or may increase the risk for medical problems. BMI can also be used to promote changes to reach a healthy weight.  This information is not intended to replace advice given to you by your health care provider. Make sure you discuss any questions you have with your health care provider.  Document Released: 08/29/2005 Document Revised: 04/27/2017 Document Reviewed: 05/15/2015  [employer] Interactive Patient Education © 2018 [employer]    Edema  Edema is an abnormal buildup of fluids in your body tissues. Edema is somewhat dependent on gravity to pull the fluid to the lowest place in your body. That makes the condition more common in the legs and thighs (lower extremities). Painless swelling of the feet and ankles is common and becomes more likely as you get older. It is also common in looser tissues, like around your eyes.  When the affected area is squeezed, the fluid may move out of that spot and leave a dent for a few moments. This dent is called pitting.  What are the causes?  There are many possible causes of edema. Eating too much salt and being on your feet or sitting for a long time can cause edema in your legs and ankles. Hot weather may make edema worse. Common medical causes of edema include:  · Heart failure.  · Liver disease.  · Kidney disease.  · Weak blood vessels in your legs.  · Cancer.  · An injury.  · Pregnancy.  · Some medications.  · Obesity.    What are the signs or symptoms?  Edema is usually painless. Your skin may look swollen or shiny.  How is this diagnosed?  Your health care provider may be able to diagnose edema by asking about your medical history and doing a physical exam. You may need to have tests such as X-rays, an electrocardiogram, or blood tests to check for medical conditions that may cause edema.  How is this treated?  Edema  treatment depends on the cause. If you have heart, liver, or kidney disease, you need the treatment appropriate for these conditions. General treatment may include:  · Elevation of the affected body part above the level of your heart.  · Compression of the affected body part. Pressure from elastic bandages or support stockings squeezes the tissues and forces fluid back into the blood vessels. This keeps fluid from entering the tissues.  · Restriction of fluid and salt intake.  · Use of a water pill (diuretic). These medications are appropriate only for some types of edema. They pull fluid out of your body and make you urinate more often. This gets rid of fluid and reduces swelling, but diuretics can have side effects. Only use diuretics as directed by your health care provider.    Follow these instructions at home:  · Keep the affected body part above the level of your heart when you are lying down.  · Do not sit still or stand for prolonged periods.  · Do not put anything directly under your knees when lying down.  · Do not wear constricting clothing or garters on your upper legs.  · Exercise your legs to work the fluid back into your blood vessels. This may help the swelling go down.  · Wear elastic bandages or support stockings to reduce ankle swelling as directed by your health care provider.  · Eat a low-salt diet to reduce fluid if your health care provider recommends it.  · Only take medicines as directed by your health care provider.  Contact a health care provider if:  · Your edema is not responding to treatment.  · You have heart, liver, or kidney disease and notice symptoms of edema.  · You have edema in your legs that does not improve after elevating them.  · You have sudden and unexplained weight gain.  Get help right away if:  · You develop shortness of breath or chest pain.  · You cannot breathe when you lie down.  · You develop pain, redness, or warmth in the swollen areas.  · You have heart, liver,  or kidney disease and suddenly get edema.  · You have a fever and your symptoms suddenly get worse.  This information is not intended to replace advice given to you by your health care provider. Make sure you discuss any questions you have with your health care provider.  Document Released: 12/18/2006 Document Revised: 05/25/2017 Document Reviewed: 10/10/2014  [employer] Interactive Patient Education © 2017 [employer]

## 2019-01-09 NOTE — PROGRESS NOTES
Subjective   Radha Van is a 48 y.o. female     Chief Complaint   Patient presents with   • Follow-up     Pt in office to follow up on testing    • Coronary Artery Disease       HPI    Problem List:    1. CAD  1.1 stress test 11/2/15-large dense anterior ischemic defect, intermediate risk  1.2 C 1/7/16 - Stent LAD  1.3 stress test 10/9/1A-low risk, no ischemia  2. Hypertension  3. Dyspnea  3.1 Echo 11/2/15 - mild LVH; EF > 65%; DD I; trace MR & TR; mild AK; PA 20-25  3.2 Echo 10/9/18-mild LVH, diastolic dysfunction 1, EF 66-70%, trace TR, trace MR  4. Edema  5. Anxiety        6. Dyslipidemia     Patient is a 48-year-old female who presents today for follow-up on testing.  She denies any chest pain, pressure, palpitations, fluttering, dizziness, presyncope, syncope, orthopnea or PND.  She does get swelling in her legs but she's been taking a diuretic daily.  She says she doesn't really get short of breath for some time she says that she feels like she needs to take a deep breath.  Patient said when she had her previous stent placed she had had significant jaw pain working out.  She says she gets just a little bit of jaw pain now but it only when she is really upset and she clenches her jaw.  She says nothing like what she had with her stent placement.    We went over stress and echo.    Current Outpatient Medications   Medication Sig Dispense Refill   • aspirin (ASPIRIN LOW DOSE) 81 MG EC tablet Take 81 mg by mouth Daily.     • atorvastatin (LIPITOR) 40 MG tablet take 1 tablet by mouth EVERY EVENING 90 tablet 3   • buPROPion XL (WELLBUTRIN XL) 300 MG 24 hr tablet take 1 tablet by mouth once daily 90 tablet 3   • clopidogrel (PLAVIX) 75 MG tablet take 1 tablet by mouth once daily 90 tablet 3   • furosemide (LASIX) 20 MG tablet Take 1 tablet by mouth Daily. MAY TAKE AN ADDITIONAL DOSE ON THE DAYS THAT THE SWELLING IS INCREASED. 90 tablet 3   • MELATONIN PO Take  by mouth Every Night.     • nitroglycerin  (NITROSTAT) 0.4 MG SL tablet Place 1 tablet under the tongue Every 5 (Five) Minutes As Needed for Chest Pain. 30 tablet 5   • potassium chloride (KLOR-CON) 10 MEQ CR tablet Take 1 tablet by mouth Daily. MAY TAKE AN ADDITIONAL DOSE ON THE DAYS THAT THE SWELLING IS INCREASED WITH LASIX 90 tablet 3     No current facility-administered medications for this visit.        ALLERGIES    Erythromycin    Past Medical History:   Diagnosis Date   • Ankle fracture, right     She does have edema in her RLE due to cracked bone in her right ankle. Patient says she fell down stairs. She is suppose to have a boot on, but because of the weather she wore a sneaker. Patient says that she had just started to exercise again when this happened. Foot has been painful today.   • Anxiety    • CAD in native artery     1.1. C 1/7/16 - Stent LAD   • Chest pain    • Diverticulitis    • Dyspnea     3.1 Echo 11/2/15 - mild LVH; EF > 65%; DD I; trace MR & TR; mild AK; PA 20-25   • Edema     Related to ankle fracture.   • Hypertension     The patient presents for follow-up of primary hypertension. The patient states she has been stable with her blood pressure control since the last visit. Comorbid Illnesses: coronary artery disease.    • Obesity     BMI 31.95.       Social History     Socioeconomic History   • Marital status:      Spouse name: Not on file   • Number of children: Not on file   • Years of education: Not on file   • Highest education level: Not on file   Social Needs   • Financial resource strain: Not on file   • Food insecurity - worry: Not on file   • Food insecurity - inability: Not on file   • Transportation needs - medical: Not on file   • Transportation needs - non-medical: Not on file   Occupational History   • Not on file   Tobacco Use   • Smoking status: Never Smoker   • Smokeless tobacco: Never Used   Substance and Sexual Activity   • Alcohol use: No   • Drug use: No   • Sexual activity: Not on file   Other Topics  "Concern   • Not on file   Social History Narrative   • Not on file       Family History   Problem Relation Age of Onset   • Colon cancer Other    • Stroke Other         Total of 3   • Hypertension Other    • Heart attack Other    • Other Other         High cholesterol & Heart Surgery   • Lupus Other         Systemic lupus erythematosus       Review of Systems   Constitutional: Positive for fatigue. Negative for diaphoresis.   HENT: Negative for congestion, rhinorrhea and sore throat.    Eyes: Positive for visual disturbance.   Respiratory: Positive for shortness of breath (sometimes don't get a good breath ). Negative for chest tightness.    Cardiovascular: Positive for leg swelling (bilateral ankle edema; takes water pill daily ). Negative for chest pain and palpitations.   Gastrointestinal: Positive for nausea (Nausea x 3 days, states she thinks a stomach bug is going around her work ). Negative for abdominal pain, blood in stool, constipation, diarrhea and vomiting.   Endocrine: Negative for cold intolerance and heat intolerance.   Genitourinary: Positive for frequency (Lasix). Negative for difficulty urinating, dysuria, hematuria and urgency.   Musculoskeletal: Negative for back pain and neck pain.   Skin: Negative for rash and wound.        Pt states feet are always cold, denies numbness or tingling    Allergic/Immunologic: Positive for environmental allergies (seasonal). Negative for food allergies.   Neurological: Positive for headaches (States she's had a constant HA x 2 days. Constant dull pain. ). Negative for dizziness, syncope, weakness, light-headedness and numbness.   Hematological: Bruises/bleeds easily (bruises).   Psychiatric/Behavioral: Negative for sleep disturbance (Takes melatonin at HS ).       Objective   /88   Pulse 84   Ht 170.2 cm (67\")   Wt 95.8 kg (211 lb 1.6 oz)   SpO2 100%   BMI 33.06 kg/m²   Vitals:    01/09/19 1018   BP: 137/88   Pulse: 84   SpO2: 100%   Weight: 95.8 kg " "(211 lb 1.6 oz)   Height: 170.2 cm (67\")      Lab Results (most recent)     None        Physical Exam   Constitutional: She is oriented to person, place, and time. Vital signs are normal. She appears well-developed and well-nourished. She is active and cooperative.   HENT:   Head: Normocephalic.   Eyes: Lids are normal.   Neck: Normal carotid pulses, no hepatojugular reflux and no JVD present. Carotid bruit is not present.   Cardiovascular: Normal rate, regular rhythm and normal heart sounds.   Pulses:       Radial pulses are 2+ on the right side, and 2+ on the left side.        Dorsalis pedis pulses are 2+ on the right side, and 2+ on the left side.        Posterior tibial pulses are 2+ on the right side, and 2+ on the left side.   No edema BLE.    Pulmonary/Chest: Effort normal and breath sounds normal.   Abdominal: Normal appearance and bowel sounds are normal.   Neurological: She is alert and oriented to person, place, and time.   Skin: Skin is warm, dry and intact.   Psychiatric: She has a normal mood and affect. Her speech is normal and behavior is normal. Judgment and thought content normal. Cognition and memory are normal.       Procedure   Procedures         Assessment/Plan      Diagnosis Plan   1. CAD in native artery     2. Hyperlipidemia, unspecified hyperlipidemia type     3. Essential hypertension     4. Shortness of breath         Return in about 6 months (around 7/9/2019).    CAD-patient is on aspirin and statin.  Hyperlipidemia-patient is on Lipitor.  Hypertension patient doing well.  Shortness of breath-stable.  She will continue her medication regimen.  She'll follow-up in 6 months or sooner if any changes.         Patient's Body mass index is 33.06 kg/m². BMI is above normal parameters. Recommendations include: educational material.      Electronically signed by:        "

## 2019-06-03 DIAGNOSIS — R60.9 PERIPHERAL EDEMA: ICD-10-CM

## 2019-06-03 DIAGNOSIS — E78.5 DYSLIPIDEMIA: ICD-10-CM

## 2019-06-03 DIAGNOSIS — I25.119 CORONARY ARTERY DISEASE INVOLVING NATIVE CORONARY ARTERY WITH ANGINA PECTORIS, UNSPECIFIED WHETHER NATIVE OR TRANSPLANTED HEART (HCC): ICD-10-CM

## 2019-06-03 RX ORDER — CLOPIDOGREL BISULFATE 75 MG/1
TABLET ORAL
Qty: 90 TABLET | Refills: 0 | Status: SHIPPED | OUTPATIENT
Start: 2019-06-03 | End: 2019-07-09 | Stop reason: SDUPTHER

## 2019-06-03 RX ORDER — POTASSIUM CHLORIDE 750 MG/1
TABLET, EXTENDED RELEASE ORAL
Qty: 90 TABLET | Refills: 0 | Status: SHIPPED | OUTPATIENT
Start: 2019-06-03 | End: 2019-07-09 | Stop reason: SDUPTHER

## 2019-06-05 DIAGNOSIS — I25.119 CORONARY ARTERY DISEASE INVOLVING NATIVE CORONARY ARTERY WITH ANGINA PECTORIS, UNSPECIFIED WHETHER NATIVE OR TRANSPLANTED HEART (HCC): ICD-10-CM

## 2019-06-05 DIAGNOSIS — E78.5 DYSLIPIDEMIA: ICD-10-CM

## 2019-06-05 DIAGNOSIS — R60.9 PERIPHERAL EDEMA: ICD-10-CM

## 2019-06-05 RX ORDER — FUROSEMIDE 20 MG/1
TABLET ORAL
Qty: 90 TABLET | Refills: 0 | Status: SHIPPED | OUTPATIENT
Start: 2019-06-05 | End: 2019-07-09 | Stop reason: SDUPTHER

## 2019-06-05 RX ORDER — ATORVASTATIN CALCIUM 40 MG/1
40 TABLET, FILM COATED ORAL EVERY EVENING
Qty: 90 TABLET | Refills: 0 | Status: SHIPPED | OUTPATIENT
Start: 2019-06-05 | End: 2019-07-09 | Stop reason: SDUPTHER

## 2019-06-16 DIAGNOSIS — F41.9 ANXIETY: Chronic | ICD-10-CM

## 2019-06-19 RX ORDER — BUPROPION HYDROCHLORIDE 300 MG/1
TABLET ORAL
Qty: 90 TABLET | Refills: 3 | Status: SHIPPED | OUTPATIENT
Start: 2019-06-19 | End: 2019-07-09 | Stop reason: SDUPTHER

## 2019-06-20 ENCOUNTER — TELEPHONE (OUTPATIENT)
Dept: CARDIOLOGY | Facility: CLINIC | Age: 49
End: 2019-06-20

## 2019-07-09 ENCOUNTER — LAB (OUTPATIENT)
Dept: LAB | Facility: HOSPITAL | Age: 49
End: 2019-07-09

## 2019-07-09 ENCOUNTER — OFFICE VISIT (OUTPATIENT)
Dept: CARDIOLOGY | Facility: CLINIC | Age: 49
End: 2019-07-09

## 2019-07-09 VITALS
HEIGHT: 67 IN | BODY MASS INDEX: 32.35 KG/M2 | DIASTOLIC BLOOD PRESSURE: 83 MMHG | HEART RATE: 78 BPM | OXYGEN SATURATION: 97 % | SYSTOLIC BLOOD PRESSURE: 127 MMHG | WEIGHT: 206.1 LBS

## 2019-07-09 DIAGNOSIS — I10 ESSENTIAL HYPERTENSION: ICD-10-CM

## 2019-07-09 DIAGNOSIS — F41.9 ANXIETY: Chronic | ICD-10-CM

## 2019-07-09 DIAGNOSIS — Z00.00 HEALTHCARE MAINTENANCE: ICD-10-CM

## 2019-07-09 DIAGNOSIS — R53.81 MALAISE AND FATIGUE: ICD-10-CM

## 2019-07-09 DIAGNOSIS — Z83.3 FAMILY HISTORY OF DIABETES MELLITUS IN GRANDMOTHER: ICD-10-CM

## 2019-07-09 DIAGNOSIS — R53.83 MALAISE AND FATIGUE: ICD-10-CM

## 2019-07-09 DIAGNOSIS — E78.5 DYSLIPIDEMIA: ICD-10-CM

## 2019-07-09 DIAGNOSIS — R06.02 SHORTNESS OF BREATH: ICD-10-CM

## 2019-07-09 DIAGNOSIS — R60.9 PERIPHERAL EDEMA: ICD-10-CM

## 2019-07-09 DIAGNOSIS — I25.119 CORONARY ARTERY DISEASE INVOLVING NATIVE CORONARY ARTERY WITH ANGINA PECTORIS, UNSPECIFIED WHETHER NATIVE OR TRANSPLANTED HEART (HCC): ICD-10-CM

## 2019-07-09 DIAGNOSIS — I25.10 CAD IN NATIVE ARTERY: Primary | Chronic | ICD-10-CM

## 2019-07-09 DIAGNOSIS — E78.5 HYPERLIPIDEMIA, UNSPECIFIED HYPERLIPIDEMIA TYPE: ICD-10-CM

## 2019-07-09 PROBLEM — R60.0 PERIPHERAL EDEMA: Status: ACTIVE | Noted: 2019-07-09

## 2019-07-09 LAB
ALBUMIN SERPL-MCNC: 4.39 G/DL (ref 3.5–5.2)
ALBUMIN/GLOB SERPL: 1.4 G/DL
ALP SERPL-CCNC: 90 U/L (ref 39–117)
ALT SERPL W P-5'-P-CCNC: 38 U/L (ref 1–33)
ANION GAP SERPL CALCULATED.3IONS-SCNC: 12 MMOL/L (ref 5–15)
AST SERPL-CCNC: 103 U/L (ref 1–32)
BILIRUB SERPL-MCNC: 0.5 MG/DL (ref 0.2–1.2)
BUN BLD-MCNC: 10 MG/DL (ref 6–20)
BUN/CREAT SERPL: 10.6 (ref 7–25)
CALCIUM SPEC-SCNC: 9.6 MG/DL (ref 8.6–10.5)
CHLORIDE SERPL-SCNC: 105 MMOL/L (ref 98–107)
CHOLEST SERPL-MCNC: 134 MG/DL (ref 0–200)
CO2 SERPL-SCNC: 22 MMOL/L (ref 22–29)
CREAT BLD-MCNC: 0.94 MG/DL (ref 0.57–1)
GFR SERPL CREATININE-BSD FRML MDRD: 64 ML/MIN/1.73
GLOBULIN UR ELPH-MCNC: 3.2 GM/DL
GLUCOSE BLD-MCNC: 100 MG/DL (ref 65–99)
HBA1C MFR BLD: 5.5 % (ref 4.8–5.6)
HDLC SERPL-MCNC: 51 MG/DL (ref 40–60)
LDLC SERPL CALC-MCNC: 53 MG/DL (ref 0–100)
LDLC/HDLC SERPL: 1.05 {RATIO}
POTASSIUM BLD-SCNC: 4.3 MMOL/L (ref 3.5–5.2)
PROT SERPL-MCNC: 7.6 G/DL (ref 6–8.5)
SODIUM BLD-SCNC: 139 MMOL/L (ref 136–145)
TRIGL SERPL-MCNC: 148 MG/DL (ref 0–150)
TSH SERPL DL<=0.05 MIU/L-ACNC: 2.03 MIU/ML (ref 0.27–4.2)
VLDLC SERPL-MCNC: 29.6 MG/DL

## 2019-07-09 PROCEDURE — 80053 COMPREHEN METABOLIC PANEL: CPT | Performed by: NURSE PRACTITIONER

## 2019-07-09 PROCEDURE — 84443 ASSAY THYROID STIM HORMONE: CPT | Performed by: NURSE PRACTITIONER

## 2019-07-09 PROCEDURE — 80061 LIPID PANEL: CPT | Performed by: NURSE PRACTITIONER

## 2019-07-09 PROCEDURE — 99213 OFFICE O/P EST LOW 20 MIN: CPT | Performed by: NURSE PRACTITIONER

## 2019-07-09 PROCEDURE — 83036 HEMOGLOBIN GLYCOSYLATED A1C: CPT | Performed by: NURSE PRACTITIONER

## 2019-07-09 RX ORDER — ASPIRIN 81 MG/1
81 TABLET ORAL DAILY
Qty: 90 TABLET | Refills: 3 | Status: SHIPPED | OUTPATIENT
Start: 2019-07-09 | End: 2020-07-15 | Stop reason: SDUPTHER

## 2019-07-09 RX ORDER — BUPROPION HYDROCHLORIDE 300 MG/1
300 TABLET ORAL DAILY
Qty: 90 TABLET | Refills: 3 | Status: SHIPPED | OUTPATIENT
Start: 2019-07-09 | End: 2020-07-15 | Stop reason: SDUPTHER

## 2019-07-09 RX ORDER — CLOPIDOGREL BISULFATE 75 MG/1
75 TABLET ORAL DAILY
Qty: 90 TABLET | Refills: 3 | Status: SHIPPED | OUTPATIENT
Start: 2019-07-09 | End: 2020-07-15 | Stop reason: SDUPTHER

## 2019-07-09 RX ORDER — POTASSIUM CHLORIDE 750 MG/1
10 TABLET, EXTENDED RELEASE ORAL DAILY
Qty: 90 TABLET | Refills: 3 | Status: SHIPPED | OUTPATIENT
Start: 2019-07-09 | End: 2020-07-15 | Stop reason: SDUPTHER

## 2019-07-09 RX ORDER — ATORVASTATIN CALCIUM 40 MG/1
40 TABLET, FILM COATED ORAL EVERY EVENING
Qty: 90 TABLET | Refills: 3 | Status: SHIPPED | OUTPATIENT
Start: 2019-07-09 | End: 2020-07-15 | Stop reason: SDUPTHER

## 2019-07-09 RX ORDER — FUROSEMIDE 20 MG/1
20 TABLET ORAL DAILY
Qty: 90 TABLET | Refills: 3 | Status: SHIPPED | OUTPATIENT
Start: 2019-07-09 | End: 2020-07-15 | Stop reason: SDUPTHER

## 2019-07-09 NOTE — PROGRESS NOTES
Subjective   Radha Van is a 48 y.o. female     Chief Complaint   Patient presents with   • Follow-up     Here for a 6 month follow up        HPI    Problem List:    1. CAD  1.1 stress test 11/2/15-large dense anterior ischemic defect, intermediate risk  1.2 LHC 1/7/16 - Stent LAD  1.3 stress test 10/9/1A-low risk, no ischemia  2. Hypertension  3. Dyspnea  3.1 Echo 11/2/15 - mild LVH; EF > 65%; DD I; trace MR & TR; mild [place]; PA 20-25  3.2 Echo 10/9/18-mild LVH, diastolic dysfunction 1, EF 66-70%, trace TR, trace MR  4. Edema  5. Anxiety        6. Dyslipidemia     Patient is a 48-year-old female who presents today for follow-up.  She denies any chest pain, pressure, palpitations, fluttering, dizziness, presyncope, syncope, orthopnea or PND.  She says that she does get swelling in her legs but it typically goes down overnight.  She has not had to take more than just 1 tablet of her Lasix at a time.  She denies any shortness of breath with activity.  She is taking thrive and it has given her more energy.  She has not had blood work in a year.  She has been trying to walk 2 miles a day.    Current Outpatient Medications   Medication Sig Dispense Refill   • aspirin (ASPIRIN LOW DOSE) 81 MG EC tablet Take 1 tablet by mouth Daily. 90 tablet 3   • atorvastatin (LIPITOR) 40 MG tablet Take 1 tablet by mouth Every Evening. 90 tablet 3   • buPROPion XL (WELLBUTRIN XL) 300 MG 24 hr tablet Take 1 tablet by mouth Daily. 90 tablet 3   • clopidogrel (PLAVIX) 75 MG tablet Take 1 tablet by mouth Daily. 90 tablet 3   • furosemide (LASIX) 20 MG tablet Take 1 tablet by mouth Daily. 90 tablet 3   • MELATONIN PO Take  by mouth Every Night.     • nitroglycerin (NITROSTAT) 0.4 MG SL tablet Place 1 tablet under the tongue Every 5 (Five) Minutes As Needed for Chest Pain. 30 tablet 5   • potassium chloride (K-DUR,KLOR-CON) 10 MEQ CR tablet Take 1 tablet by mouth Daily. 90 tablet 3     No current facility-administered medications for this  visit.        ALLERGIES    Erythromycin    Past Medical History:   Diagnosis Date   • Ankle fracture, right     She does have edema in her RLE due to cracked bone in her right ankle. Patient says she fell down stairs. She is suppose to have a boot on, but because of the weather she wore a sneaker. Patient says that she had just started to exercise again when this happened. Foot has been painful today.   • Anxiety    • CAD in native artery     1.1. LHC 1/7/16 - Stent LAD   • Chest pain    • Diverticulitis    • Dyspnea     3.1 Echo 11/2/15 - mild LVH; EF > 65%; DD I; trace MR & TR; mild AR; PA 20-25   • Edema     Related to ankle fracture.   • Hypertension     The patient presents for follow-up of primary hypertension. The patient states she has been stable with her blood pressure control since the last visit. Comorbid Illnesses: coronary artery disease.    • Obesity     BMI 31.95.       Social History     Socioeconomic History   • Marital status:      Spouse name: Not on file   • Number of children: Not on file   • Years of education: Not on file   • Highest education level: Not on file   Tobacco Use   • Smoking status: Never Smoker   • Smokeless tobacco: Never Used   Substance and Sexual Activity   • Alcohol use: No   • Drug use: No       Family History   Problem Relation Age of Onset   • Colon cancer Other    • Stroke Other         Total of 3   • Hypertension Other    • Heart attack Other    • Other Other         High cholesterol & Heart Surgery   • Lupus Other         Systemic lupus erythematosus       Review of Systems   Constitutional: Negative for chills, fatigue and fever.   HENT: Negative for congestion, rhinorrhea and sore throat.    Eyes: Positive for visual disturbance (glasses ).   Respiratory: Negative for chest tightness and shortness of breath.    Cardiovascular: Positive for leg swelling (Occasional edema which resolves overnight ). Negative for chest pain and palpitations.   Gastrointestinal:  "Negative for abdominal pain, blood in stool, nausea and vomiting.   Endocrine: Negative for cold intolerance and heat intolerance.   Genitourinary: Negative for dysuria, frequency, hematuria and urgency.        Was in ER about one month ago and they think she may have passed a kidney stone.    Musculoskeletal: Negative for arthralgias and back pain.   Skin: Negative for rash and wound.   Allergic/Immunologic: Positive for environmental allergies (seasonal ). Negative for food allergies.   Neurological: Negative for dizziness, weakness and light-headedness.   Hematological: Bruises/bleeds easily (bruises easily ).   Psychiatric/Behavioral: Negative for sleep disturbance (Denies waking with smothering or SOA).       Objective   /83 (BP Location: Left arm, Patient Position: Sitting)   Pulse 78   Ht 170.2 cm (67\")   Wt 93.5 kg (206 lb 1.6 oz)   SpO2 97%   BMI 32.28 kg/m²   Vitals:    07/09/19 1024   BP: 127/83   BP Location: Left arm   Patient Position: Sitting   Pulse: 78   SpO2: 97%   Weight: 93.5 kg (206 lb 1.6 oz)   Height: 170.2 cm (67\")      Lab Results (most recent)     None        Physical Exam   Constitutional: She is oriented to person, place, and time. Vital signs are normal. She appears well-developed and well-nourished. She is active and cooperative.   HENT:   Head: Normocephalic.   Eyes: Lids are normal.   Neck: Normal carotid pulses, no hepatojugular reflux and no JVD present. Carotid bruit is not present.   Cardiovascular: Normal rate, regular rhythm and normal heart sounds.   Pulses:       Radial pulses are 2+ on the right side, and 2+ on the left side.        Dorsalis pedis pulses are 2+ on the right side, and 2+ on the left side.        Posterior tibial pulses are 2+ on the right side, and 2+ on the left side.   No edema BLE.   Pulmonary/Chest: Effort normal and breath sounds normal.   Abdominal: Normal appearance and bowel sounds are normal.   Neurological: She is alert and oriented to " person, place, and time.   Skin: Skin is warm, dry and intact.   Psychiatric: She has a normal mood and affect. Her speech is normal and behavior is normal. Judgment and thought content normal. Cognition and memory are normal.       Procedure   Procedures         Assessment/Plan      Diagnosis Plan   1. CAD in native artery  aspirin (ASPIRIN LOW DOSE) 81 MG EC tablet   2. Essential hypertension  Comprehensive Metabolic Panel    TSH   3. Hyperlipidemia, unspecified hyperlipidemia type  Lipid Panel   4. Shortness of breath     5. Peripheral edema  potassium chloride (K-DUR,KLOR-CON) 10 MEQ CR tablet    furosemide (LASIX) 20 MG tablet   6. Dyslipidemia  atorvastatin (LIPITOR) 40 MG tablet    clopidogrel (PLAVIX) 75 MG tablet   7. Coronary artery disease involving native coronary artery with angina pectoris, unspecified whether native or transplanted heart (CMS/[facility])  atorvastatin (LIPITOR) 40 MG tablet    clopidogrel (PLAVIX) 75 MG tablet    Lipid Panel   8. Anxiety  buPROPion XL (WELLBUTRIN XL) 300 MG 24 hr tablet    TSH   9. Family history of diabetes mellitus in grandmother  Hemoglobin A1c   10. Healthcare maintenance  Comprehensive Metabolic Panel    Lipid Panel    TSH    Hemoglobin A1c   11. Malaise and fatigue  TSH       Return in about 6 months (around 1/9/2020).    CAD-patient's on aspirin, Plavix and statin.  Hypertension-patient is doing very well just with diet.  Hyperlipidemia-patient is on Lipitor.  Shortness of breath-resolved.  Peripheral edema-patient uses Lasix.  Anxiety-refilled her Wellbutrin.  Patient will get CMP, lipid, TSH and hemoglobin A1c.  She will continue her medication regimen.  She will follow-up in 6 months or sooner if any changes.         Patient's Body mass index is 32.28 kg/m². BMI is above normal parameters. Recommendations include: educational material and referral to primary care.      Electronically signed by:

## 2019-07-09 NOTE — PATIENT INSTRUCTIONS
"Fat and Cholesterol Restricted Eating Plan  Getting too much fat and cholesterol in your diet may cause health problems. Choosing the right foods helps keep your fat and cholesterol at normal levels. This can keep you from getting certain diseases.  Your doctor may recommend an eating plan that includes:  · Total fat: ______% or less of total calories a day.  · Saturated fat: ______% or less of total calories a day.  · Cholesterol: less than _________mg a day.  · Fiber: ______g a day.    What are tips for following this plan?  General tips  · Work with your doctor to lose weight if you need to.  · Avoid:  ? Foods with added sugar.  ? Fried foods.  ? Foods with partially hydrogenated oils.  · Limit alcohol intake to no more than 1 drink a day for nonpregnant women and 2 drinks a day for men. One drink equals 12 oz of beer, 5 oz of wine, or 1½ oz of hard liquor.  Reading food labels  · Check food labels for:  ? Trans fats.  ? Partially hydrogenated oils.  ? Saturated fat (g) in each serving.  ? Cholesterol (mg) in each serving.  ? Fiber (g) in each serving.  · Choose foods with healthy fats, such as:  ? Monounsaturated fats.  ? Polyunsaturated fats.  ? Omega-3 fats.  · Choose grain products that have whole grains. Look for the word \"whole\" as the first word in the ingredient list.  Cooking  · Cook foods using low-fat methods. These include baking, boiling, grilling, and broiling.  · Eat more home-cooked foods. Eat at restaurants and buffets less often.  · Avoid cooking using saturated fats, such as butter, cream, palm oil, palm kernel oil, and coconut oil.  Meal planning  · At meals, divide your plate into four equal parts:  ? Fill one-half of your plate with vegetables and green salads.  ? Fill one-fourth of your plate with whole grains.  ? Fill one-fourth of your plate with low-fat (lean) protein foods.  · Eat fish that is high in omega-3 fats at least two times a week. This includes mackerel, tuna, sardines, and " salmon.  · Eat foods that are high in fiber, such as whole grains, beans, apples, broccoli, carrots, peas, and barley.  Recommended foods  Grains  · Whole grains, such as whole wheat or whole grain breads, crackers, cereals, and pasta. Unsweetened oatmeal, bulgur, barley, quinoa, or brown rice. Corn or whole wheat flour tortillas.  Vegetables  · Fresh or frozen vegetables (raw, steamed, roasted, or grilled). Green salads.  Fruits  · All fresh, canned (in natural juice), or frozen fruits.  Meats and other protein foods  · Ground beef (85% or leaner), grass-fed beef, or beef trimmed of fat. Skinless chicken or turkey. Ground chicken or turkey. Pork trimmed of fat. All fish and seafood. Egg whites. Dried beans, peas, or lentils. Unsalted nuts or seeds. Unsalted canned beans. Nut butters without added sugar or oil.  Dairy  · Low-fat or nonfat dairy products, such as skim or 1% milk, 2% or reduced-fat cheeses, low-fat and fat-free ricotta or cottage cheese, or plain low-fat and nonfat yogurt.  Fats and oils  · Tub margarine without trans fats. Light or reduced-fat mayonnaise and salad dressings. Avocado. Olive, canola, sesame, or safflower oils.  The items listed above may not be a complete list of recommended foods or beverages. Contact your dietitian for more options.  Foods to avoid  Grains  · White bread. White pasta. White rice. Cornbread. Bagels, pastries, and croissants. Crackers and snack foods that contain trans fat and hydrogenated oils.  Vegetables  · Vegetables cooked in cheese, cream, or butter sauce. Fried vegetables.  Fruits  · Canned fruit in heavy syrup. Fruit in cream or butter sauce. Fried fruit.  Meats and other protein foods  · Fatty cuts of meat. Ribs, chicken wings, pineda, sausage, bologna, salami, chitterlings, fatback, hot dogs, bratwurst, and packaged lunch meats. Liver and organ meats. Whole eggs and egg yolks. Chicken and turkey with skin. Fried meat.  Dairy  · Whole or 2% milk, cream,  half-and-half, and cream cheese. Whole milk cheeses. Whole-fat or sweetened yogurt. Full-fat cheeses. Nondairy creamers and whipped toppings. Processed cheese, cheese spreads, and cheese curds.  Beverages  · Alcohol. Sugar-sweetened drinks such as sodas, lemonade, and fruit drinks.  Fats and oils  · Butter, stick margarine, lard, shortening, ghee, or pineda fat. Coconut, palm kernel, and palm oils.  Sweets and desserts  · Corn syrup, sugars, honey, and molasses. Candy. Jam and jelly. Syrup. Sweetened cereals. Cookies, pies, cakes, donuts, muffins, and ice cream.  The items listed above may not be a complete list of foods and beverages to avoid. Contact your dietitian for more information.  Summary  · Choosing the right foods helps keep your fat and cholesterol at normal levels. This can keep you from getting certain diseases.  · At meals, fill one-half of your plate with vegetables and green salads.  · Eat high-fiber foods, like whole grains, beans, apples, carrots, peas, and barley.  · Limit added sugar, saturated fats, alcohol, and fried foods.  This information is not intended to replace advice given to you by your health care provider. Make sure you discuss any questions you have with your health care provider.  Document Released: 06/18/2013 Document Revised: 09/04/2018 Document Reviewed: 09/04/2018  [employer] Interactive Patient Education © 2019 [employer]  BMI for Adults  Body mass index (BMI) is a number that is calculated from a person's weight and height. In most adults, the number is used to find how much of an adult's weight is made up of fat. BMI is not as accurate as a direct measure of body fat.  How is BMI calculated?  BMI is calculated by dividing weight in kilograms by height in meters squared. It can also be calculated by dividing weight in pounds by height in inches squared, then multiplying the resulting number by 703. Charts are available to help you find your BMI quickly and easily without  "doing this calculation.  How is BMI interpreted?  Health care professionals use BMI charts to identify whether an adult is underweight, at a normal weight, or overweight based on the following guidelines:  · Underweight: BMI less than 18.5.  · Normal weight: BMI between 18.5 and 24.9.  · Overweight: BMI between 25 and 29.9.  · Obese: BMI of 30 and above.    BMI is usually interpreted the same for males and females.  Weight includes both fat and muscle, so someone with a muscular build, such as an athlete, may have a BMI that is higher than 24.9. In cases like these, BMI may not accurately depict body fat. To determine if excess body fat is the cause of a BMI of 25 or higher, further assessments may need to be done by a health care provider.  Why is BMI a useful tool?  BMI is used to identify a possible weight problem that may be related to a medical problem or may increase the risk for medical problems. BMI can also be used to promote changes to reach a healthy weight.  This information is not intended to replace advice given to you by your health care provider. Make sure you discuss any questions you have with your health care provider.  Document Released: 08/29/2005 Document Revised: 04/27/2017 Document Reviewed: 05/15/2015  [employer] Interactive Patient Education © 2018 [employer]    Hypertension  Hypertension, commonly called high blood pressure, is when the force of blood pumping through the arteries is too strong. The arteries are the blood vessels that carry blood from the heart throughout the body. Hypertension forces the heart to work harder to pump blood and may cause arteries to become narrow or stiff. Having untreated or uncontrolled hypertension can cause heart attacks, strokes, kidney disease, and other problems.  A blood pressure reading consists of a higher number over a lower number. Ideally, your blood pressure should be below 120/80. The first (\"top\") number is called the systolic pressure. It " "is a measure of the pressure in your arteries as your heart beats. The second (\"bottom\") number is called the diastolic pressure. It is a measure of the pressure in your arteries as the heart relaxes.  What are the causes?  The cause of this condition is not known.  What increases the risk?  Some risk factors for high blood pressure are under your control. Others are not.  Factors you can change  · Smoking.  · Having type 2 diabetes mellitus, high cholesterol, or both.  · Not getting enough exercise or physical activity.  · Being overweight.  · Having too much fat, sugar, calories, or salt (sodium) in your diet.  · Drinking too much alcohol.  Factors that are difficult or impossible to change  · Having chronic kidney disease.  · Having a family history of high blood pressure.  · Age. Risk increases with age.  · Race. You may be at higher risk if you are -American.  · Gender. Men are at higher risk than women before age 45. After age 65, women are at higher risk than men.  · Having obstructive sleep apnea.  · Stress.  What are the signs or symptoms?  Extremely high blood pressure (hypertensive crisis) may cause:  · Headache.  · Anxiety.  · Shortness of breath.  · Nosebleed.  · Nausea and vomiting.  · Severe chest pain.  · Jerky movements you cannot control (seizures).    How is this diagnosed?  This condition is diagnosed by measuring your blood pressure while you are seated, with your arm resting on a surface. The cuff of the blood pressure monitor will be placed directly against the skin of your upper arm at the level of your heart. It should be measured at least twice using the same arm. Certain conditions can cause a difference in blood pressure between your right and left arms.  Certain factors can cause blood pressure readings to be lower or higher than normal (elevated) for a short period of time:  · When your blood pressure is higher when you are in a health care provider's office than when you are at " home, this is called white coat hypertension. Most people with this condition do not need medicines.  · When your blood pressure is higher at home than when you are in a health care provider's office, this is called masked hypertension. Most people with this condition may need medicines to control blood pressure.    If you have a high blood pressure reading during one visit or you have normal blood pressure with other risk factors:  · You may be asked to return on a different day to have your blood pressure checked again.  · You may be asked to monitor your blood pressure at home for 1 week or longer.    If you are diagnosed with hypertension, you may have other blood or imaging tests to help your health care provider understand your overall risk for other conditions.  How is this treated?  This condition is treated by making healthy lifestyle changes, such as eating healthy foods, exercising more, and reducing your alcohol intake. Your health care provider may prescribe medicine if lifestyle changes are not enough to get your blood pressure under control, and if:  · Your systolic blood pressure is above 130.  · Your diastolic blood pressure is above 80.    Your personal target blood pressure may vary depending on your medical conditions, your age, and other factors.  Follow these instructions at home:  Eating and drinking  · Eat a diet that is high in fiber and potassium, and low in sodium, added sugar, and fat. An example eating plan is called the DASH (Dietary Approaches to Stop Hypertension) diet. To eat this way:  ? Eat plenty of fresh fruits and vegetables. Try to fill half of your plate at each meal with fruits and vegetables.  ? Eat whole grains, such as whole wheat pasta, brown rice, or whole grain bread. Fill about one quarter of your plate with whole grains.  ? Eat or drink low-fat dairy products, such as skim milk or low-fat yogurt.  ? Avoid fatty cuts of meat, processed or cured meats, and poultry with  skin. Fill about one quarter of your plate with lean proteins, such as fish, chicken without skin, beans, eggs, and tofu.  ? Avoid premade and processed foods. These tend to be higher in sodium, added sugar, and fat.  · Reduce your daily sodium intake. Most people with hypertension should eat less than 1,500 mg of sodium a day.  · Limit alcohol intake to no more than 1 drink a day for nonpregnant women and 2 drinks a day for men. One drink equals 12 oz of beer, 5 oz of wine, or 1½ oz of hard liquor.  Lifestyle  · Work with your health care provider to maintain a healthy body weight or to lose weight. Ask what an ideal weight is for you.  · Get at least 30 minutes of exercise that causes your heart to beat faster (aerobic exercise) most days of the week. Activities may include walking, swimming, or biking.  · Include exercise to strengthen your muscles (resistance exercise), such as pilates or lifting weights, as part of your weekly exercise routine. Try to do these types of exercises for 30 minutes at least 3 days a week.  · Do not use any products that contain nicotine or tobacco, such as cigarettes and e-cigarettes. If you need help quitting, ask your health care provider.  · Monitor your blood pressure at home as told by your health care provider.  · Keep all follow-up visits as told by your health care provider. This is important.  Medicines  · Take over-the-counter and prescription medicines only as told by your health care provider. Follow directions carefully. Blood pressure medicines must be taken as prescribed.  · Do not skip doses of blood pressure medicine. Doing this puts you at risk for problems and can make the medicine less effective.  · Ask your health care provider about side effects or reactions to medicines that you should watch for.  Contact a health care provider if:  · You think you are having a reaction to a medicine you are taking.  · You have headaches that keep coming back  (recurring).  · You feel dizzy.  · You have swelling in your ankles.  · You have trouble with your vision.  Get help right away if:  · You develop a severe headache or confusion.  · You have unusual weakness or numbness.  · You feel faint.  · You have severe pain in your chest or abdomen.  · You vomit repeatedly.  · You have trouble breathing.  Summary  · Hypertension is when the force of blood pumping through your arteries is too strong. If this condition is not controlled, it may put you at risk for serious complications.  · Your personal target blood pressure may vary depending on your medical conditions, your age, and other factors. For most people, a normal blood pressure is less than 120/80.  · Hypertension is treated with lifestyle changes, medicines, or a combination of both. Lifestyle changes include weight loss, eating a healthy, low-sodium diet, exercising more, and limiting alcohol.  This information is not intended to replace advice given to you by your health care provider. Make sure you discuss any questions you have with your health care provider.  Document Released: 12/18/2006 Document Revised: 11/15/2017 Document Reviewed: 11/15/2017  [employer] Interactive Patient Education © 2019 [employer]

## 2019-07-10 ENCOUNTER — TELEPHONE (OUTPATIENT)
Dept: CARDIOLOGY | Facility: CLINIC | Age: 49
End: 2019-07-10

## 2019-07-10 DIAGNOSIS — R74.8 ELEVATED LIVER ENZYMES: Primary | ICD-10-CM

## 2019-07-10 NOTE — TELEPHONE ENCOUNTER
ALT (SGPT) 1 - 33 U/L 38 Abnormally high   31 R   AST (SGOT) 1 - 32 U/L 103 Abnormally high   21 R

## 2019-07-10 NOTE — TELEPHONE ENCOUNTER
Informed pt of results and the message from Nayely, she verbalized understanding.   Pt stated that she has no hx of liver problems and hasn't been on any old meds or Tylenol. Will get CMP in 2 weeks.

## 2019-07-10 NOTE — TELEPHONE ENCOUNTER
----- Message from DEVI Centeno sent at 7/10/2019  7:23 AM EDT -----  Advise patient.  Her Liver enzymes are elevated, taken any cold meds or tylenol OTC lately?  Any liver problems in past?  Repeat CMP in 2 weeks if still elevated we will refer to GI.

## 2020-01-09 ENCOUNTER — OFFICE VISIT (OUTPATIENT)
Dept: CARDIOLOGY | Facility: CLINIC | Age: 50
End: 2020-01-09

## 2020-01-09 ENCOUNTER — LAB (OUTPATIENT)
Dept: CARDIOLOGY | Facility: CLINIC | Age: 50
End: 2020-01-09

## 2020-01-09 VITALS
BODY MASS INDEX: 32.65 KG/M2 | DIASTOLIC BLOOD PRESSURE: 80 MMHG | WEIGHT: 208 LBS | OXYGEN SATURATION: 98 % | HEART RATE: 105 BPM | SYSTOLIC BLOOD PRESSURE: 125 MMHG | HEIGHT: 67 IN

## 2020-01-09 DIAGNOSIS — E78.5 HYPERLIPIDEMIA, UNSPECIFIED HYPERLIPIDEMIA TYPE: ICD-10-CM

## 2020-01-09 DIAGNOSIS — Z83.3 FAMILY HISTORY OF DIABETES MELLITUS IN FATHER: ICD-10-CM

## 2020-01-09 DIAGNOSIS — R06.02 SHORTNESS OF BREATH: ICD-10-CM

## 2020-01-09 DIAGNOSIS — I25.10 CAD IN NATIVE ARTERY: Chronic | ICD-10-CM

## 2020-01-09 DIAGNOSIS — I25.10 CAD IN NATIVE ARTERY: Primary | Chronic | ICD-10-CM

## 2020-01-09 DIAGNOSIS — I10 ESSENTIAL HYPERTENSION: ICD-10-CM

## 2020-01-09 DIAGNOSIS — Z00.00 HEALTHCARE MAINTENANCE: ICD-10-CM

## 2020-01-09 DIAGNOSIS — R53.83 MALAISE AND FATIGUE: ICD-10-CM

## 2020-01-09 DIAGNOSIS — R53.81 MALAISE AND FATIGUE: ICD-10-CM

## 2020-01-09 DIAGNOSIS — R60.9 PERIPHERAL EDEMA: ICD-10-CM

## 2020-01-09 LAB
ALBUMIN SERPL-MCNC: 4.24 G/DL (ref 3.5–5.2)
ALBUMIN/GLOB SERPL: 1.4 G/DL
ALP SERPL-CCNC: 76 U/L (ref 39–117)
ALT SERPL W P-5'-P-CCNC: 19 U/L (ref 1–33)
ANION GAP SERPL CALCULATED.3IONS-SCNC: 15.6 MMOL/L (ref 5–15)
AST SERPL-CCNC: 20 U/L (ref 1–32)
BILIRUB SERPL-MCNC: 0.3 MG/DL (ref 0.2–1.2)
BUN BLD-MCNC: 8 MG/DL (ref 6–20)
BUN/CREAT SERPL: 9.3 (ref 7–25)
CALCIUM SPEC-SCNC: 9.4 MG/DL (ref 8.6–10.5)
CHLORIDE SERPL-SCNC: 102 MMOL/L (ref 98–107)
CHOLEST SERPL-MCNC: 136 MG/DL (ref 0–200)
CO2 SERPL-SCNC: 23.4 MMOL/L (ref 22–29)
CREAT BLD-MCNC: 0.86 MG/DL (ref 0.57–1)
GFR SERPL CREATININE-BSD FRML MDRD: 70 ML/MIN/1.73
GLOBULIN UR ELPH-MCNC: 3.1 GM/DL
GLUCOSE BLD-MCNC: 105 MG/DL (ref 65–99)
HBA1C MFR BLD: 5.5 % (ref 4.8–5.6)
HDLC SERPL-MCNC: 51 MG/DL (ref 40–60)
LDLC SERPL CALC-MCNC: 44 MG/DL (ref 0–100)
LDLC/HDLC SERPL: 0.85 {RATIO}
POTASSIUM BLD-SCNC: 4.2 MMOL/L (ref 3.5–5.2)
PROT SERPL-MCNC: 7.3 G/DL (ref 6–8.5)
SODIUM BLD-SCNC: 141 MMOL/L (ref 136–145)
TRIGL SERPL-MCNC: 207 MG/DL (ref 0–150)
TSH SERPL DL<=0.05 MIU/L-ACNC: 1.9 UIU/ML (ref 0.27–4.2)
VLDLC SERPL-MCNC: 41.4 MG/DL

## 2020-01-09 PROCEDURE — 93000 ELECTROCARDIOGRAM COMPLETE: CPT | Performed by: NURSE PRACTITIONER

## 2020-01-09 PROCEDURE — 99214 OFFICE O/P EST MOD 30 MIN: CPT | Performed by: NURSE PRACTITIONER

## 2020-01-09 PROCEDURE — 80061 LIPID PANEL: CPT | Performed by: NURSE PRACTITIONER

## 2020-01-09 PROCEDURE — 83036 HEMOGLOBIN GLYCOSYLATED A1C: CPT | Performed by: NURSE PRACTITIONER

## 2020-01-09 PROCEDURE — 80053 COMPREHEN METABOLIC PANEL: CPT | Performed by: NURSE PRACTITIONER

## 2020-01-09 PROCEDURE — 84443 ASSAY THYROID STIM HORMONE: CPT | Performed by: NURSE PRACTITIONER

## 2020-01-09 PROCEDURE — 82607 VITAMIN B-12: CPT | Performed by: NURSE PRACTITIONER

## 2020-01-09 NOTE — PATIENT INSTRUCTIONS

## 2020-01-09 NOTE — PROGRESS NOTES
Subjective   Radha Van is a 49 y.o. female     Chief Complaint   Patient presents with   • Coronary Artery Disease       HPI    Problem List:    1. CAD  1.1 stress test 11/2/15-large dense anterior ischemic defect, intermediate risk  1.2 C 1/7/16 - Stent LAD  1.3 stress test 10/9/18-low risk, no ischemia  2. Hypertension  3. Dyspnea  3.1 Echo 11/2/15 - mild LVH; EF > 65%; DD I; trace MR & TR; mild IA; PA 20-25  3.2 Echo 10/9/18-mild LVH, diastolic dysfunction 1, EF 66-70%, trace TR, trace MR  4. Edema  5. Anxiety        6. Dyslipidemia     Patient is a 49-year-old female who presents today for a follow-up.  She denies any chest pain, pressure, palpitations, fluttering, dizziness, presyncope, syncope, orthopnea, PND or edema.  She says the only time she gets short of breath is when she gets anxious.  She says this typically occurs only when she is in a very crowded environment.  Otherwise she says she does fine.  We went over her blood work and she was not able to get her liver enzymes rechecked previously so she will get them done today.    Current Outpatient Medications on File Prior to Visit   Medication Sig Dispense Refill   • aspirin (ASPIRIN LOW DOSE) 81 MG EC tablet Take 1 tablet by mouth Daily. 90 tablet 3   • atorvastatin (LIPITOR) 40 MG tablet Take 1 tablet by mouth Every Evening. 90 tablet 3   • buPROPion XL (WELLBUTRIN XL) 300 MG 24 hr tablet Take 1 tablet by mouth Daily. 90 tablet 3   • clopidogrel (PLAVIX) 75 MG tablet Take 1 tablet by mouth Daily. 90 tablet 3   • furosemide (LASIX) 20 MG tablet Take 1 tablet by mouth Daily. 90 tablet 3   • MELATONIN PO Take  by mouth Every Night.     • nitroglycerin (NITROSTAT) 0.4 MG SL tablet Place 1 tablet under the tongue Every 5 (Five) Minutes As Needed for Chest Pain. 30 tablet 5   • potassium chloride (K-DUR,KLOR-CON) 10 MEQ CR tablet Take 1 tablet by mouth Daily. 90 tablet 3     No current facility-administered medications on file prior to visit.              ALLERGIES    Erythromycin    Past Medical History:   Diagnosis Date   • Ankle fracture, right     She does have edema in her RLE due to cracked bone in her right ankle. Patient says she fell down stairs. She is suppose to have a boot on, but because of the weather she wore a sneaker. Patient says that she had just started to exercise again when this happened. Foot has been painful today.   • Anxiety    • CAD in native artery     1.1. C 1/7/16 - Stent LAD   • Chest pain    • Diverticulitis    • Dyspnea     3.1 Echo 11/2/15 - mild LVH; EF > 65%; DD I; trace MR & TR; mild VA; PA 20-25   • Edema     Related to ankle fracture.   • Hyperlipidemia    • Hypertension     The patient presents for follow-up of primary hypertension. The patient states she has been stable with her blood pressure control since the last visit. Comorbid Illnesses: coronary artery disease.    • Obesity     BMI 31.95.       Social History     Socioeconomic History   • Marital status:      Spouse name: Not on file   • Number of children: Not on file   • Years of education: Not on file   • Highest education level: Not on file   Tobacco Use   • Smoking status: Never Smoker   • Smokeless tobacco: Never Used   Substance and Sexual Activity   • Alcohol use: No   • Drug use: No   • Sexual activity: Defer       Family History   Problem Relation Age of Onset   • Colon cancer Other    • Stroke Other         Total of 3   • Hypertension Other    • Heart attack Other    • Other Other         High cholesterol & Heart Surgery   • Lupus Other         Systemic lupus erythematosus       Review of Systems   Constitutional: Negative for fatigue.   HENT: Positive for rhinorrhea (runny nose from allergies and cold weather) and sneezing (sneezing from allergies). Negative for congestion.    Eyes: Positive for visual disturbance (wears reading glasses PRN).   Respiratory: Positive for shortness of breath (when she was anxious in a crowded store over the  "holidays ). Negative for cough, chest tightness and wheezing.    Cardiovascular: Negative for chest pain, palpitations and leg swelling.   Gastrointestinal: Negative for abdominal pain, nausea and vomiting.   Endocrine: Negative for cold intolerance and heat intolerance.   Genitourinary: Negative for difficulty urinating, frequency and urgency.   Musculoskeletal: Negative for arthralgias, back pain, neck pain and neck stiffness.   Skin: Negative for rash and wound.   Allergic/Immunologic: Negative for environmental allergies and food allergies.   Neurological: Negative for dizziness, syncope and light-headedness.   Hematological: Bruises/bleeds easily (bruises easily).   Psychiatric/Behavioral: Positive for agitation (eaisly agitated ). Negative for confusion and sleep disturbance (denies waking up smothering/SOA). The patient is nervous/anxious (easily nervous/anxious).        Objective   /80 (BP Location: Left arm, Patient Position: Sitting)   Pulse 105   Ht 170.2 cm (67\")   Wt 94.3 kg (208 lb)   SpO2 98%   BMI 32.58 kg/m²   Vitals:    01/09/20 1038   BP: 125/80   BP Location: Left arm   Patient Position: Sitting   Pulse: 105   SpO2: 98%   Weight: 94.3 kg (208 lb)   Height: 170.2 cm (67\")      Lab Results (most recent)     None        Physical Exam   Constitutional: She is oriented to person, place, and time. Vital signs are normal. She appears well-developed and well-nourished. She is active and cooperative.   HENT:   Head: Normocephalic.   Eyes: Lids are normal.   Neck: Normal carotid pulses, no hepatojugular reflux and no JVD present. Carotid bruit is not present.   Cardiovascular: Regular rhythm and normal heart sounds. Tachycardia present.   Pulses:       Radial pulses are 2+ on the right side, and 2+ on the left side.        Dorsalis pedis pulses are 2+ on the right side, and 2+ on the left side.        Posterior tibial pulses are 2+ on the right side, and 2+ on the left side.   No edema BLE. "   Pulmonary/Chest: Effort normal and breath sounds normal.   Abdominal: Normal appearance and bowel sounds are normal.   Neurological: She is alert and oriented to person, place, and time.   Skin: Skin is warm, dry and intact.   Psychiatric: She has a normal mood and affect. Her speech is normal and behavior is normal. Judgment and thought content normal. Cognition and memory are normal.       Procedure     ECG 12 Lead  Date/Time: 1/9/2020 12:54 PM  Performed by: Nayely Ponce APRN  Authorized by: Nayely Ponce APRN   Comparison: compared with previous ECG from 9/5/2018  Similar to previous ECG  Rhythm: sinus rhythm  Rate: normal  BPM: 90  QRS axis: normal  Other findings: low voltage    Clinical impression: non-specific ECG                 Assessment/Plan      Diagnosis Plan   1. CAD in native artery     2. Essential hypertension     3. Hyperlipidemia, unspecified hyperlipidemia type     4. Shortness of breath     5. Peripheral edema         Return in about 6 months (around 7/9/2020).    CAD-patient is on aspirin, Plavix and statin.  Hypertension-patient does well without any medication at this time.  Hyperlipidemia-patient is on Lipitor and doing well.  Her last LDL was 53.  Shortness of breath-stable.  Peripheral edema-patient uses Lasix as needed.  She will continue her medication regimen.  She will follow-up in 6 months or sooner if any changes.  She will get a CMP today.  If her liver enzymes are still elevated we will refer her to gastroenterology.       Patient's Body mass index is 32.58 kg/m². BMI is above normal parameters. Recommendations include: educational material and referral to primary care.      Electronically signed by:

## 2020-01-10 ENCOUNTER — TELEPHONE (OUTPATIENT)
Dept: CARDIOLOGY | Facility: CLINIC | Age: 50
End: 2020-01-10

## 2020-01-10 LAB — VIT B12 BLD-MCNC: 502 PG/ML (ref 211–946)

## 2020-01-10 NOTE — TELEPHONE ENCOUNTER
CALLED PATIENT AND NOTIFIED HER OF LAB RESULTS. PATIENT INSTRUCTED TO WATCH DIET. PATIENT STATED SHE WAS NOT FASTING WHEN SHE HAD LABS. FAXED TO PCP AS WELL. PATIENT VERBALIZED UNDERSTANDING AND HAD NO FURTHER QUESTIONS AT THIS TIME. NOTIFIED TO KEEP FOLLOW UP AS SCHEDULED. -;[facility]      ----- Message from DEVI Centeno sent at 1/10/2020  6:41 AM EST -----  Trig are up and glucose slightly elevated.  I think she was not completely fasting.  If not then no worries, just watch sweets/carbs.  If she was fasting have her get HGBA1C.

## 2020-07-15 ENCOUNTER — LAB (OUTPATIENT)
Dept: CARDIOLOGY | Facility: CLINIC | Age: 50
End: 2020-07-15

## 2020-07-15 ENCOUNTER — OFFICE VISIT (OUTPATIENT)
Dept: CARDIOLOGY | Facility: CLINIC | Age: 50
End: 2020-07-15

## 2020-07-15 VITALS
SYSTOLIC BLOOD PRESSURE: 116 MMHG | TEMPERATURE: 97.5 F | DIASTOLIC BLOOD PRESSURE: 75 MMHG | HEART RATE: 72 BPM | BODY MASS INDEX: 32.55 KG/M2 | WEIGHT: 207.4 LBS | OXYGEN SATURATION: 98 % | HEIGHT: 67 IN

## 2020-07-15 DIAGNOSIS — R53.83 MALAISE AND FATIGUE: ICD-10-CM

## 2020-07-15 DIAGNOSIS — I25.10 CAD IN NATIVE ARTERY: Primary | Chronic | ICD-10-CM

## 2020-07-15 DIAGNOSIS — F41.9 ANXIETY: Chronic | ICD-10-CM

## 2020-07-15 DIAGNOSIS — Z00.00 HEALTHCARE MAINTENANCE: ICD-10-CM

## 2020-07-15 DIAGNOSIS — R53.81 MALAISE AND FATIGUE: ICD-10-CM

## 2020-07-15 DIAGNOSIS — I10 ESSENTIAL HYPERTENSION: ICD-10-CM

## 2020-07-15 DIAGNOSIS — E78.5 DYSLIPIDEMIA: ICD-10-CM

## 2020-07-15 DIAGNOSIS — R00.2 PALPITATIONS: ICD-10-CM

## 2020-07-15 DIAGNOSIS — E78.5 HYPERLIPIDEMIA, UNSPECIFIED HYPERLIPIDEMIA TYPE: ICD-10-CM

## 2020-07-15 DIAGNOSIS — I25.119 CORONARY ARTERY DISEASE INVOLVING NATIVE CORONARY ARTERY WITH ANGINA PECTORIS, UNSPECIFIED WHETHER NATIVE OR TRANSPLANTED HEART (HCC): ICD-10-CM

## 2020-07-15 DIAGNOSIS — R60.9 PERIPHERAL EDEMA: ICD-10-CM

## 2020-07-15 DIAGNOSIS — R06.02 SHORTNESS OF BREATH: ICD-10-CM

## 2020-07-15 LAB
BASOPHILS # BLD AUTO: 0.06 10*3/MM3 (ref 0–0.2)
BASOPHILS NFR BLD AUTO: 1.1 % (ref 0–1.5)
DEPRECATED RDW RBC AUTO: 59.7 FL (ref 37–54)
EOSINOPHIL # BLD AUTO: 0.1 10*3/MM3 (ref 0–0.4)
EOSINOPHIL NFR BLD AUTO: 1.9 % (ref 0.3–6.2)
ERYTHROCYTE [DISTWIDTH] IN BLOOD BY AUTOMATED COUNT: 17.1 % (ref 12.3–15.4)
HCT VFR BLD AUTO: 36.7 % (ref 34–46.6)
HGB BLD-MCNC: 10.9 G/DL (ref 12–15.9)
IMM GRANULOCYTES # BLD AUTO: 0.02 10*3/MM3 (ref 0–0.05)
IMM GRANULOCYTES NFR BLD AUTO: 0.4 % (ref 0–0.5)
IRON 24H UR-MRATE: 338 MCG/DL (ref 37–145)
IRON SATN MFR SERPL: 72 % (ref 20–50)
LYMPHOCYTES # BLD AUTO: 1.45 10*3/MM3 (ref 0.7–3.1)
LYMPHOCYTES NFR BLD AUTO: 27.1 % (ref 19.6–45.3)
MCH RBC QN AUTO: 29.1 PG (ref 26.6–33)
MCHC RBC AUTO-ENTMCNC: 29.7 G/DL (ref 31.5–35.7)
MCV RBC AUTO: 97.9 FL (ref 79–97)
MONOCYTES # BLD AUTO: 0.37 10*3/MM3 (ref 0.1–0.9)
MONOCYTES NFR BLD AUTO: 6.9 % (ref 5–12)
NEUTROPHILS NFR BLD AUTO: 3.36 10*3/MM3 (ref 1.7–7)
NEUTROPHILS NFR BLD AUTO: 62.6 % (ref 42.7–76)
NRBC BLD AUTO-RTO: 0 /100 WBC (ref 0–0.2)
PLATELET # BLD AUTO: 306 10*3/MM3 (ref 140–450)
PMV BLD AUTO: 10.5 FL (ref 6–12)
RBC # BLD AUTO: 3.75 10*6/MM3 (ref 3.77–5.28)
T4 FREE SERPL-MCNC: 1.4 NG/DL (ref 0.93–1.7)
TIBC SERPL-MCNC: 468 MCG/DL (ref 298–536)
TRANSFERRIN SERPL-MCNC: 314 MG/DL (ref 200–360)
TSH SERPL DL<=0.05 MIU/L-ACNC: 1.83 UIU/ML (ref 0.27–4.2)
WBC # BLD AUTO: 5.36 10*3/MM3 (ref 3.4–10.8)

## 2020-07-15 PROCEDURE — 99214 OFFICE O/P EST MOD 30 MIN: CPT | Performed by: NURSE PRACTITIONER

## 2020-07-15 PROCEDURE — 83540 ASSAY OF IRON: CPT | Performed by: NURSE PRACTITIONER

## 2020-07-15 PROCEDURE — 36415 COLL VENOUS BLD VENIPUNCTURE: CPT

## 2020-07-15 PROCEDURE — 84443 ASSAY THYROID STIM HORMONE: CPT | Performed by: NURSE PRACTITIONER

## 2020-07-15 PROCEDURE — 82397 CHEMILUMINESCENT ASSAY: CPT | Performed by: NURSE PRACTITIONER

## 2020-07-15 PROCEDURE — 85025 COMPLETE CBC W/AUTO DIFF WBC: CPT | Performed by: NURSE PRACTITIONER

## 2020-07-15 PROCEDURE — 84481 FREE ASSAY (FT-3): CPT | Performed by: NURSE PRACTITIONER

## 2020-07-15 PROCEDURE — 84439 ASSAY OF FREE THYROXINE: CPT | Performed by: NURSE PRACTITIONER

## 2020-07-15 PROCEDURE — 84466 ASSAY OF TRANSFERRIN: CPT | Performed by: NURSE PRACTITIONER

## 2020-07-15 PROCEDURE — 83001 ASSAY OF GONADOTROPIN (FSH): CPT | Performed by: NURSE PRACTITIONER

## 2020-07-15 RX ORDER — BUPROPION HYDROCHLORIDE 300 MG/1
300 TABLET ORAL DAILY
Qty: 90 TABLET | Refills: 3 | Status: SHIPPED | OUTPATIENT
Start: 2020-07-15 | End: 2020-09-14

## 2020-07-15 RX ORDER — ATORVASTATIN CALCIUM 40 MG/1
40 TABLET, FILM COATED ORAL EVERY EVENING
Qty: 90 TABLET | Refills: 3 | Status: SHIPPED | OUTPATIENT
Start: 2020-07-15 | End: 2021-03-04 | Stop reason: SDUPTHER

## 2020-07-15 RX ORDER — CLOPIDOGREL BISULFATE 75 MG/1
75 TABLET ORAL DAILY
Qty: 90 TABLET | Refills: 3 | Status: SHIPPED | OUTPATIENT
Start: 2020-07-15 | End: 2021-03-04 | Stop reason: SDUPTHER

## 2020-07-15 RX ORDER — ASPIRIN 81 MG/1
81 TABLET ORAL DAILY
Qty: 90 TABLET | Refills: 3 | Status: SHIPPED | OUTPATIENT
Start: 2020-07-15 | End: 2021-03-04 | Stop reason: SDUPTHER

## 2020-07-15 RX ORDER — FUROSEMIDE 20 MG/1
20 TABLET ORAL DAILY
Qty: 90 TABLET | Refills: 3 | Status: SHIPPED | OUTPATIENT
Start: 2020-07-15 | End: 2020-09-18

## 2020-07-15 RX ORDER — POTASSIUM CHLORIDE 750 MG/1
10 TABLET, EXTENDED RELEASE ORAL DAILY
Qty: 90 TABLET | Refills: 3 | Status: SHIPPED | OUTPATIENT
Start: 2020-07-15 | End: 2020-09-02

## 2020-07-15 NOTE — PROGRESS NOTES
Subjective   Radha Van is a 49 y.o. female     Chief Complaint   Patient presents with   • Coronary Artery Disease       HPI    Problem List:    1. CAD  1.1 stress test 11/2/15-large dense anterior ischemic defect, intermediate risk  1.2 LHC 1/7/16 - Stent LAD  1.3 stress test 10/9/18-low risk, no ischemia  2. Hypertension  3. Dyspnea  3.1 Echo 11/2/15 - mild LVH; EF > 65%; DD I; trace MR & TR; mild W[place]; PA 20-25  3.2 Echo 10/9/18-mild LVH, diastolic dysfunction 1, EF 66-70%, trace TR, trace MR  4. Edema  5. Anxiety        6. Dyslipidemia     Patient is a 49-year-old female who presents today for a follow-up.  She denies any chest pain or pressure.  She has noticed when she exerts herself her heart will race which she has not done this in the past.  She denies any dizziness, presyncope, syncope, orthopnea, PND or edema.  She says that she does have shortness of breath and fatigue which is been a little worse for the last couple of months.  [place] her.  She becomes more short of breath and her heart will race.  She says she has absolutely no energy whatsoever.  She has been caring for her father who is no longer able to ambulate due to his stroke after an incident in the hospital.  She denies she has a lot of stress but she is feels like something is not right.  She also thinks she potentially could be menopausal.    We went over her labs from January.    Current Outpatient Medications on File Prior to Visit   Medication Sig Dispense Refill   • BIOTIN PO Take  by mouth Daily.     • Iron Combinations (IRON COMPLEX PO) Take  by mouth Daily.     • nitroglycerin (NITROSTAT) 0.4 MG SL tablet Place 1 tablet under the tongue Every 5 (Five) Minutes As Needed for Chest Pain. 30 tablet 5   • [DISCONTINUED] aspirin (ASPIRIN LOW DOSE) 81 MG EC tablet Take 1 tablet by mouth Daily. 90 tablet 3   • [DISCONTINUED] atorvastatin (LIPITOR) 40 MG tablet Take  1 tablet by mouth Every Evening. 90 tablet 3   • [DISCONTINUED] buPROPion XL (WELLBUTRIN XL) 300 MG 24 hr tablet Take 1 tablet by mouth Daily. 90 tablet 3   • [DISCONTINUED] clopidogrel (PLAVIX) 75 MG tablet Take 1 tablet by mouth Daily. 90 tablet 3   • [DISCONTINUED] furosemide (LASIX) 20 MG tablet Take 1 tablet by mouth Daily. 90 tablet 3   • [DISCONTINUED] potassium chloride (K-DUR,KLOR-CON) 10 MEQ CR tablet Take 1 tablet by mouth Daily. 90 tablet 3   • [DISCONTINUED] MELATONIN PO Take  by mouth Every Night.       No current facility-administered medications on file prior to visit.        ALLERGIES    Erythromycin    Past Medical History:   Diagnosis Date   • Ankle fracture, right     She does have edema in her RLE due to cracked bone in her right ankle. Patient says she fell down stairs. She is suppose to have a boot on, but because of the weather she wore a sneaker. Patient says that she had just started to exercise again when this happened. Foot has been painful today.   • Anxiety    • CAD in native artery     1.1. C 1/7/16 - Stent LAD   • Chest pain    • Diverticulitis    • Dyspnea     3.1 Echo 11/2/15 - mild LVH; EF > 65%; DD I; trace MR & TR; mild CA; PA 20-25   • Edema     Related to ankle fracture.   • Hyperlipidemia    • Hypertension     The patient presents for follow-up of primary hypertension. The patient states she has been stable with her blood pressure control since the last visit. Comorbid Illnesses: coronary artery disease.    • Obesity     BMI 31.95.       Social History     Socioeconomic History   • Marital status:      Spouse name: Not on file   • Number of children: Not on file   • Years of education: Not on file   • Highest education level: Not on file   Tobacco Use   • Smoking status: Never Smoker   • Smokeless tobacco: Never Used   Substance and Sexual Activity   • Alcohol use: No   • Drug use: No   • Sexual activity: Defer       Family History   Problem Relation Age of Onset   •  "Colon cancer Other    • Stroke Other         Total of 3   • Hypertension Other    • Heart attack Other    • Other Other         High cholesterol & Heart Surgery   • Lupus Other         Systemic lupus erythematosus       Review of Systems   Constitutional: Positive for fatigue (EASILY FATIGUED, CAN FALL ASLEEP AT ANYTIME, 2-3 MOS). Negative for diaphoresis and fever.   HENT: Negative for congestion, rhinorrhea and sneezing.    Eyes: Positive for visual disturbance (WEARS READING GLASSES PRN).   Respiratory: Positive for shortness of breath (SOA ON EXERTION ONLY; PAST COUPLE OF MOS, HILL SHE IS USE TO TAKING IS NOW HARDER FOR HER; FEELS HEART RACING WHEN IT OCCURS ). Negative for cough, chest tightness and wheezing.    Cardiovascular: Positive for palpitations (HEART RACES). Negative for chest pain and leg swelling.   Gastrointestinal: Negative for abdominal pain, blood in stool, constipation, diarrhea, nausea and vomiting.   Endocrine: Positive for heat intolerance (HOT FLASHES). Negative for cold intolerance.   Genitourinary: Positive for frequency (URINARY FREQUENCY). Negative for difficulty urinating, hematuria and urgency.   Musculoskeletal: Negative for arthralgias, back pain, neck pain and neck stiffness.   Skin: Negative for rash and wound.   Allergic/Immunologic: Positive for environmental allergies (SEASONAL ALLERGIES). Negative for food allergies.   Neurological: Negative for dizziness, syncope, weakness, light-headedness and numbness.   Hematological: Bruises/bleeds easily (BRUISES EASILY).   Psychiatric/Behavioral: Negative for agitation, confusion and sleep disturbance (DENIES WAKING UP SMOTHERING/SOA). The patient is nervous/anxious (EASILY NERVOUS/ANXIOUS).        Objective   /75 (BP Location: Left arm, Patient Position: Sitting)   Pulse 72   Temp 97.5 °F (36.4 °C)   Ht 170.2 cm (67\")   Wt 94.1 kg (207 lb 6.4 oz)   SpO2 98%   BMI 32.48 kg/m²   Vitals:    07/15/20 0847   BP: 116/75   BP " "Location: Left arm   Patient Position: Sitting   Pulse: 72   Temp: 97.5 °F (36.4 °C)   SpO2: 98%   Weight: 94.1 kg (207 lb 6.4 oz)   Height: 170.2 cm (67\")      Lab Results (most recent)     None        Physical Exam   Constitutional: She is oriented to person, place, and time. Vital signs are normal. She appears well-developed and well-nourished. She is active and cooperative.   HENT:   Head: Normocephalic.   Eyes: Lids are normal.   Neck: Normal carotid pulses, no hepatojugular reflux and no JVD present. Carotid bruit is not present.   Cardiovascular: Normal rate, regular rhythm and normal heart sounds.   Pulses:       Radial pulses are 2+ on the right side, and 2+ on the left side.        Dorsalis pedis pulses are 2+ on the right side, and 2+ on the left side.        Posterior tibial pulses are 2+ on the right side, and 2+ on the left side.   No edema bilateral lower extremities   Pulmonary/Chest: Effort normal and breath sounds normal.   Abdominal: Normal appearance and bowel sounds are normal.   Neurological: She is alert and oriented to person, place, and time.   Skin: Skin is warm, dry and intact.   Psychiatric: She has a normal mood and affect. Her speech is normal and behavior is normal. Judgment and thought content normal. Cognition and memory are normal.       Procedure   Procedures         Assessment/Plan      Diagnosis Plan   1. CAD in native artery  aspirin (aspirin) 81 MG EC tablet   2. Essential hypertension     3. Hyperlipidemia, unspecified hyperlipidemia type     4. Shortness of breath     5. Peripheral edema  furosemide (LASIX) 20 MG tablet    potassium chloride (K-DUR,KLOR-CON) 10 MEQ CR tablet   6. Anxiety  buPROPion XL (WELLBUTRIN XL) 300 MG 24 hr tablet   7. Dyslipidemia  atorvastatin (LIPITOR) 40 MG tablet    clopidogrel (PLAVIX) 75 MG tablet   8. Coronary artery disease involving native coronary artery with angina pectoris, unspecified whether native or transplanted heart (CMS/HCC)  " atorvastatin (LIPITOR) 40 MG tablet    clopidogrel (PLAVIX) 75 MG tablet   9. Malaise and fatigue  CBC & Differential    Iron Profile    Follicle Stimulating Hormone    Antimullerian Hormone (AMH)    T3, Free    T4, Free    TSH   10. Palpitations  Cardiac Event Monitor   11. Healthcare maintenance  T3, Free    T4, Free    TSH       Return in about 12 weeks (around 10/7/2020).    CAD-patient's on aspirin and statin.  Hypertension-patient is doing well without any medication at this time.  Hyperlipidemia-patient's on Lipitor and her LDL is excellent.  Shortness of breath-stable.  Peripheral edema-patient uses Lasix as needed.  Anxiety-filled her Wellbutrin.  Fatigue-patient will get CBC, iron, FSH, AMH, free T3, free T4 and TSH.  Palpitations-she will wear an event monitor for 2 weeks.  She will continue her medication regimen otherwise.  Follow-up in 12 weeks or sooner if any changes.  Patient did not want have an ischemia work-up as she is still pain on her previous 1.          Patient's Body mass index is 32.48 kg/m². BMI is above normal parameters. Recommendations include: educational material and referral to primary care.      Electronically signed by:

## 2020-07-15 NOTE — PATIENT INSTRUCTIONS

## 2020-07-16 ENCOUNTER — TELEPHONE (OUTPATIENT)
Dept: CARDIOLOGY | Facility: CLINIC | Age: 50
End: 2020-07-16

## 2020-07-16 LAB
FSH SERPL-ACNC: 11.2 MIU/ML
T3FREE SERPL-MCNC: 3.44 PG/ML (ref 2–4.4)

## 2020-07-16 NOTE — TELEPHONE ENCOUNTER
Called patient and reviewed lab results with patient, notified we faxed copy to PCP as well. Patient verbalized understanding, notified to keep follow up as scheduled. -;[facility]    ---- Message from DEVI Centeno sent at 7/16/2020  6:32 AM EDT -----  Please advise patient.  Iron is actually high, but she is still somewhat anemic.  Forwarding labs to PCP.  So far does not appear to be menopausal.  Follicle Stimulating Hormone   Order: [id number]   Status:  Final result   Visible to patient:  No (Not Released) Dx:  Malaise and fatigue   Specimen Information: Blood        Component  Ref Range & Units 1d ago   FSH  mIU/mL 11.20    Resulting Agency  [facility]      Narrative   Performed by:  [facility]   FSH Reference Ranges:    Adult Males:  1.5-12.4 mIU/mL    Adult Females:   Folicular Phase  3.5-12.5 mIU/ml   Ovulation Phase  4.7-21.5 mIU/ml   Lutal Phase      1.7-7.7 mIU/ml   Postmenopausal   25.8-134.8 mIU/ml     Results may be falsely decreased if patient taking Biotin.      Specimen Collected: 07/15/20 09:36 Last Resulted: 07/16/20 06:01         Lab Flowsheet       Order Details       View Encounter       Lab and Collection Details       Routing       Result History            Related Result Highlights         T3, Free  Final result 7/15/2020                  Result Notes for TSH     Notes recorded by Nayely Ponce APRN on 7/16/2020 at 6:32 AM EDT  Please advise patient.  Iron is actually high, but she is still somewhat anemic.  Forwarding labs to PCP.  So far does not appear to be menopausal.   TSH   Order: [id number]   Status:  Final result   Visible to patient:  No (Not Released) Dx:  Healthcare maintenance; Malaise and f...   Specimen Information: Blood        Component  Ref Range & Units 1d ago 6mo ago 1yr ago   TSH  0.270 - 4.200 uIU/mL 1.830  1.900  2.030 R   [facility]         Specimen Collected: 07/15/20 09:36 Last Resulted: 07/15/20 19:07         Lab Flowsheet        Order Details       View Encounter       Lab and Collection Details       Routing       Result History         R=Reference range differs from displayed range           Result Notes for T4, Free     Notes recorded by Nayely Ponce APRN on 7/16/2020 at 6:32 AM EDT  Please advise patient.  Iron is actually high, but she is still somewhat anemic.  Forwarding labs to PCP.  So far does not appear to be menopausal.   T4, Free   Order: [id number]   Status:  Final result   Visible to patient:  No (Not Released) Dx:  Healthcare maintenance; Malaise and f...   Specimen Information: Blood        Component  Ref Range & Units 1d ago   Free T4  0.93 - 1.70 ng/dL 1.40    Resulting Agency [facility]      Narrative   Performed by: [facility]   Results may be falsely increased if patient taking Biotin.      Specimen Collected: 07/15/20 09:36 Last Resulted: 07/15/20 19:07         Lab Flowsheet       Order Details       View Encounter       Lab and Collection Details       Routing       Result History               Result Notes for Iron Profile     Notes recorded by Nayely Ponce APRN on 7/16/2020 at 6:32 AM EDT  Please advise patient.  Iron is actually high, but she is still somewhat anemic.  Forwarding labs to PCP.  So far does not appear to be menopausal.   Contains abnormal data Iron Profile   Order: [id number]   Status:  Final result   Visible to patient:  No (Not Released) Dx:  Malaise and fatigue   Specimen Information: Blood        Component  Ref Range & Units 1d ago   Iron  37 - 145 mcg/dL 338High     Iron Saturation  20 - 50 % 72High     Transferrin  200 - 360 mg/dL 314    TIBC  298 - 536 mcg/dL 468    Resulting Agency [facility]         Specimen Collected: 07/15/20 09:36 Last Resulted: 07/15/20 18:54         Lab Flowsheet       Order Details       View Encounter       Lab and Collection Details       Routing       Result History               Result Notes for CBC Auto Differential     Notes recorded by Nayely Ponce  DEVI TREVINO on 7/16/2020 at 6:32 AM EDT  Please advise patient.  Iron is actually high, but she is still somewhat anemic.  Forwarding labs to PCP.  So far does not appear to be menopausal.   Contains abnormal data CBC Auto Differential   Order: [id number] - Part of Panel Order [id number]   Status:  Final result   Visible to patient:  No (Not Released) Dx:  Malaise and fatigue   Specimen Information: Blood        Component  Ref Range & Units 1d ago   WBC  3.40 - 10.80 10*3/mm3 5.36    RBC  3.77 - 5.28 10*6/mm3 3.75Low     Hemoglobin  12.0 - 15.9 g/dL 10.9Low     Hematocrit  34.0 - 46.6 % 36.7    MCV  79.0 - 97.0 fL 97.9High     MCH  26.6 - 33.0 pg 29.1    MCHC  31.5 - 35.7 g/dL 29.7Low     RDW  12.3 - 15.4 % 17.1High     RDW-SD  37.0 - 54.0 fl 59.7High     MPV  6.0 - 12.0 fL 10.5    Platelets  140 - 450 10*3/mm3 306    Neutrophil %  42.7 - 76.0 % 62.6    Lymphocyte %  19.6 - 45.3 % 27.1    Monocyte %  5.0 - 12.0 % 6.9    Eosinophil %  0.3 - 6.2 % 1.9    Basophil %  0.0 - 1.5 % 1.1    Immature Grans %  0.0 - 0.5 % 0.4    Neutrophils, Absolute  1.70 - 7.00 10*3/mm3 3.36    Lymphocytes, Absolute  0.70 - 3.10 10*3/mm3 1.45    Monocytes, Absolute  0.10 - 0.90 10*3/mm3 0.37    Eosinophils, Absolute  0.00 - 0.40 10*3/mm3 0.10    Basophils, Absolute  0.00 - 0.20 10*3/mm3 0.06    Immature Grans, Absolute  0.00 - 0.05 10*3/mm3 0.02    nRBC  0.0 - 0.2 /100 WBC 0.0    Resulting Agency  [facility]         Specimen Collected: 07/15/20 09:36 Last Resulted: 07/15/20 18:31

## 2020-07-20 LAB — MIS SERPL-MCNC: 0.07 NG/ML

## 2020-09-02 DIAGNOSIS — R60.9 PERIPHERAL EDEMA: ICD-10-CM

## 2020-09-02 RX ORDER — POTASSIUM CHLORIDE 750 MG/1
TABLET, EXTENDED RELEASE ORAL
Qty: 90 TABLET | Refills: 3 | Status: SHIPPED | OUTPATIENT
Start: 2020-09-02 | End: 2021-08-19

## 2020-09-14 DIAGNOSIS — F41.9 ANXIETY: Chronic | ICD-10-CM

## 2020-09-14 RX ORDER — BUPROPION HYDROCHLORIDE 300 MG/1
TABLET ORAL
Qty: 90 TABLET | Refills: 1 | Status: SHIPPED | OUTPATIENT
Start: 2020-09-14 | End: 2021-03-04 | Stop reason: SDUPTHER

## 2020-09-18 DIAGNOSIS — R60.9 PERIPHERAL EDEMA: ICD-10-CM

## 2020-09-18 RX ORDER — FUROSEMIDE 20 MG/1
TABLET ORAL
Qty: 90 TABLET | Refills: 3 | Status: SHIPPED | OUTPATIENT
Start: 2020-09-18 | End: 2021-08-17 | Stop reason: SDUPTHER

## 2020-10-20 ENCOUNTER — TELEPHONE (OUTPATIENT)
Dept: CARDIOLOGY | Facility: CLINIC | Age: 50
End: 2020-10-20

## 2020-12-15 ENCOUNTER — TELEPHONE (OUTPATIENT)
Dept: CARDIOLOGY | Facility: CLINIC | Age: 50
End: 2020-12-15

## 2020-12-15 NOTE — TELEPHONE ENCOUNTER
"Pt LVM w/ complaints of dizziness that started yesterday.  Also reports blood pressure higher than normal.    Contacted pt to get more information.  Reports she felt dizzy yesterday and thought she may be getting an inner ear infection.  Experienced dizziness again today. At 7AM her bp was 130/90.  At noon 160/92.  Reports bp has never been up.  Expresses concern for high bp readings.  Normal readings are around 112/66.  She does state \"I have not been feeling good last couple days.  Feel really tired and stomach ache.\"  No other symptoms reported.    "

## 2020-12-15 NOTE — TELEPHONE ENCOUNTER
Pt made aware of provider recommendations.  Verbalizes understanding.  She will contact our office if new or worsening symptoms.

## 2021-03-04 ENCOUNTER — OFFICE VISIT (OUTPATIENT)
Dept: CARDIOLOGY | Facility: CLINIC | Age: 51
End: 2021-03-04

## 2021-03-04 VITALS
TEMPERATURE: 96.4 F | SYSTOLIC BLOOD PRESSURE: 115 MMHG | HEIGHT: 67 IN | DIASTOLIC BLOOD PRESSURE: 69 MMHG | HEART RATE: 71 BPM | OXYGEN SATURATION: 96 % | BODY MASS INDEX: 33.27 KG/M2 | WEIGHT: 212 LBS

## 2021-03-04 DIAGNOSIS — R42 DIZZINESS: ICD-10-CM

## 2021-03-04 DIAGNOSIS — E78.5 HYPERLIPIDEMIA, UNSPECIFIED HYPERLIPIDEMIA TYPE: ICD-10-CM

## 2021-03-04 DIAGNOSIS — R60.9 PERIPHERAL EDEMA: ICD-10-CM

## 2021-03-04 DIAGNOSIS — I25.119 CORONARY ARTERY DISEASE INVOLVING NATIVE CORONARY ARTERY WITH ANGINA PECTORIS, UNSPECIFIED WHETHER NATIVE OR TRANSPLANTED HEART (HCC): ICD-10-CM

## 2021-03-04 DIAGNOSIS — E78.5 DYSLIPIDEMIA: ICD-10-CM

## 2021-03-04 DIAGNOSIS — F41.9 ANXIETY: Chronic | ICD-10-CM

## 2021-03-04 DIAGNOSIS — I10 ESSENTIAL HYPERTENSION: ICD-10-CM

## 2021-03-04 DIAGNOSIS — I25.10 CAD IN NATIVE ARTERY: Primary | Chronic | ICD-10-CM

## 2021-03-04 DIAGNOSIS — R06.02 SHORTNESS OF BREATH: ICD-10-CM

## 2021-03-04 PROCEDURE — 99214 OFFICE O/P EST MOD 30 MIN: CPT | Performed by: NURSE PRACTITIONER

## 2021-03-04 PROCEDURE — 93000 ELECTROCARDIOGRAM COMPLETE: CPT | Performed by: NURSE PRACTITIONER

## 2021-03-04 RX ORDER — ASPIRIN 81 MG/1
81 TABLET ORAL DAILY
Qty: 90 TABLET | Refills: 3 | Status: SHIPPED | OUTPATIENT
Start: 2021-03-04 | End: 2021-09-08 | Stop reason: SDUPTHER

## 2021-03-04 RX ORDER — ESCITALOPRAM OXALATE 10 MG/1
10 TABLET ORAL DAILY
COMMUNITY
End: 2021-09-08 | Stop reason: SDUPTHER

## 2021-03-04 RX ORDER — NITROGLYCERIN 0.4 MG/1
0.4 TABLET SUBLINGUAL
Qty: 30 TABLET | Refills: 5 | Status: SHIPPED | OUTPATIENT
Start: 2021-03-04 | End: 2021-09-01

## 2021-03-04 RX ORDER — BUPROPION HYDROCHLORIDE 300 MG/1
300 TABLET ORAL DAILY
Qty: 90 TABLET | Refills: 3 | Status: SHIPPED | OUTPATIENT
Start: 2021-03-04 | End: 2021-09-08 | Stop reason: SDUPTHER

## 2021-03-04 RX ORDER — BENAZEPRIL HYDROCHLORIDE 20 MG/1
20 TABLET ORAL DAILY
COMMUNITY
End: 2021-09-08 | Stop reason: SDUPTHER

## 2021-03-04 RX ORDER — CLOPIDOGREL BISULFATE 75 MG/1
75 TABLET ORAL DAILY
Qty: 90 TABLET | Refills: 3 | Status: SHIPPED | OUTPATIENT
Start: 2021-03-04 | End: 2021-09-08 | Stop reason: SDUPTHER

## 2021-03-04 RX ORDER — ATORVASTATIN CALCIUM 40 MG/1
40 TABLET, FILM COATED ORAL EVERY EVENING
Qty: 90 TABLET | Refills: 3 | Status: SHIPPED | OUTPATIENT
Start: 2021-03-04 | End: 2021-09-08 | Stop reason: SDUPTHER

## 2021-03-04 NOTE — PATIENT INSTRUCTIONS
"BMI for Adults  What is BMI?  Body mass index (BMI) is a number that is calculated from a person's weight and height. BMI can help estimate how much of a person's weight is composed of fat. BMI does not measure body fat directly. Rather, it is an alternative to procedures that directly measure body fat, which can be difficult and expensive.  BMI can help identify people who may be at higher risk for certain medical problems.  What are BMI measurements used for?  BMI is used as a screening tool to identify possible weight problems. It helps determine whether a person is obese, overweight, a healthy weight, or underweight.  BMI is useful for:  · Identifying a weight problem that may be related to a medical condition or may increase the risk for medical problems.  · Promoting changes, such as changes in diet and exercise, to help reach a healthy weight. BMI screening can be repeated to see if these changes are working.  How is BMI calculated?  BMI involves measuring your weight in relation to your height. Both height and weight are measured, and the BMI is calculated from those numbers. This can be done either in English ([place] or metric measurements. Note that charts and online BMI calculators are available to help you find your BMI quickly and easily without having to do these calculations yourself.  To calculate your BMI in English ([place] measurements:    1. Measure your weight in pounds (lb).  2. Multiply the number of pounds by 703.  ? For example, for a person who weighs 180 lb, multiply that number by 703, which equals 126,540.  3. Measure your height in inches. Then multiply that number by itself to get a measurement called \"inches squared.\"  ? For example, for a person who is 70 inches tall, the \"inches squared\" measurement is 70 inches x 70 inches, which equals 4,900 inches squared.  4. Divide the total from step 2 (number of lb x 703) by the total from step 3 (inches squared): 126,540 ÷ 4,900 = 25.8. This is " "your BMI.  To calculate your BMI in metric measurements:  1. Measure your weight in kilograms (kg).  2. Measure your height in meters (m). Then multiply that number by itself to get a measurement called \"meters squared.\"  ? For example, for a person who is 1.75 m tall, the \"meters squared\" measurement is 1.75 m x 1.75 m, which is equal to 3.1 meters squared.  3. Divide the number of kilograms (your weight) by the meters squared number. In this example: 70 ÷ 3.1 = 22.6. This is your BMI.  What do the results mean?  BMI charts are used to identify whether you are underweight, normal weight, overweight, or obese. The following guidelines will be used:  · Underweight: BMI less than 18.5.  · Normal weight: BMI between 18.5 and 24.9.  · Overweight: BMI between 25 and 29.9.  · Obese: BMI of 30 or above.  Keep these notes in mind:  · Weight includes both fat and muscle, so someone with a muscular build, such as an athlete, may have a BMI that is higher than 24.9. In cases like these, BMI is not an accurate measure of body fat.  · To determine if excess body fat is the cause of a BMI of 25 or higher, further assessments may need to be done by a health care provider.  · BMI is usually interpreted in the same way for men and women.  Where to find more information  For more information about BMI, including tools to quickly calculate your BMI, go to these websites:  · Centers for Disease Control and Prevention: www.cdc.gov  · American Heart Association: www.heart.org  · National Heart, Lung, and Blood [place]: www.nhlbi.nih.gov  Summary  · Body mass index (BMI) is a number that is calculated from a person's weight and height.  · BMI may help estimate how much of a person's weight is composed of fat. BMI can help identify those who may be at higher risk for certain medical problems.  · BMI can be measured using English measurements or metric measurements.  · BMI charts are used to identify whether you are underweight, normal " weight, overweight, or obese.  This information is not intended to replace advice given to you by your health care provider. Make sure you discuss any questions you have with your health care provider.  Document Revised: 09/09/2020 Document Reviewed: 07/17/2020  Elsevier Patient Education © 2020 [employer]

## 2021-03-04 NOTE — PROGRESS NOTES
Subjective   Radha Van is a 50 y.o. female     Chief Complaint   Patient presents with   • Follow-up   • Coronary Artery Disease       HPI    Problem List:    1. CAD  1.1 stress test 11/2/15-large dense anterior ischemic defect, intermediate risk  1.2 LHC 1/7/16 - Stent LAD  1.3 stress test 10/9/18-low risk, no ischemia  2. Hypertension  3. Dyspnea  3.1 Echo 11/2/15 - mild LVH; EF > 65%; DD I; trace MR & TR; mild ME; PA 20-25  3.2 Echo 10/9/18-mild LVH, diastolic dysfunction 1, EF 66-70%, trace TR, trace MR  4. Edema  5. Anxiety       6. Dyslipidemia       7. Event Monitor 7/23-8/5/2020 - NS[facility], ST    Patient is a 50-year-old female who presents today for a follow-up.  She denies any chest pain, pressure, palpitations, fluttering, dizziness, presyncope, syncope, orthopnea, PND or edema. She does have some shortness of breath with the mask and she says ever since having her stent she has never really been able to take a full breath.  Overall she is doing well.     We went over event monitor.     Current Outpatient Medications on File Prior to Visit   Medication Sig Dispense Refill   • benazepril (LOTENSIN) 20 MG tablet Take 20 mg by mouth Daily.     • BIOTIN PO Take  by mouth Daily.     • escitalopram (Lexapro) 10 MG tablet Take 10 mg by mouth Daily.     • furosemide (LASIX) 20 MG tablet TAKE 1 TABLET BY MOUTH EVERY DAY 90 tablet 3   • potassium chloride (K-DUR,KLOR-CON) 10 MEQ CR tablet TAKE 1 TABLET BY MOUTH EVERY DAY 90 tablet 3   • [DISCONTINUED] aspirin (aspirin) 81 MG EC tablet Take 1 tablet by mouth Daily. 90 tablet 3   • [DISCONTINUED] atorvastatin (LIPITOR) 40 MG tablet Take 1 tablet by mouth Every Evening. 90 tablet 3   • [DISCONTINUED] buPROPion XL (WELLBUTRIN XL) 300 MG 24 hr tablet TAKE 1 TABLET BY MOUTH EVERY DAY 90 tablet 1   • [DISCONTINUED] clopidogrel (PLAVIX) 75 MG tablet Take 1 tablet by mouth Daily. 90 tablet 3   • [DISCONTINUED] Iron Combinations (IRON COMPLEX PO) Take  by mouth Daily.      • [DISCONTINUED] nitroglycerin (NITROSTAT) 0.4 MG SL tablet Place 1 tablet under the tongue Every 5 (Five) Minutes As Needed for Chest Pain. 30 tablet 5     No current facility-administered medications on file prior to visit.        ALLERGIES    Erythromycin    Past Medical History:   Diagnosis Date   • Ankle fracture, right     She does have edema in her RLE due to cracked bone in her right ankle. Patient says she fell down stairs. She is suppose to have a boot on, but because of the weather she wore a sneaker. Patient says that she had just started to exercise again when this happened. Foot has been painful today.   • Anxiety    • CAD in native artery     1.1. [facility] 1/7/16 - Stent LAD   • Chest pain    • COVID-19 vaccine administered     1st - 01/13/2021 , 2nd -02/12/21   • Diverticulitis    • Dyspnea     3.1 Echo 11/2/15 - mild LVH; EF > 65%; DD I; trace MR & TR; mild HI; PA 20-25   • Edema     Related to ankle fracture.   • Hyperlipidemia    • Hypertension     The patient presents for follow-up of primary hypertension. The patient states she has been stable with her blood pressure control since the last visit. Comorbid Illnesses: coronary artery disease.    • Obesity     BMI 31.95.       Social History     Socioeconomic History   • Marital status:      Spouse name: Not on file   • Number of children: Not on file   • Years of education: Not on file   • Highest education level: Not on file   Tobacco Use   • Smoking status: Never Smoker   • Smokeless tobacco: Never Used   Substance and Sexual Activity   • Alcohol use: No   • Drug use: No   • Sexual activity: Defer       Family History   Problem Relation Age of Onset   • Colon cancer Other    • Stroke Other         Total of 3   • Hypertension Other    • Heart attack Other    • Other Other         High cholesterol & Heart Surgery   • Lupus Other         Systemic lupus erythematosus       Review of Systems   Constitutional: Positive for fatigue (tired alot ).  "Negative for appetite change, chills, diaphoresis and fever.   HENT: Positive for postnasal drip (after getting stuck in rain ) and rhinorrhea (clear). Negative for congestion and sore throat.    Eyes: Positive for visual disturbance (glasses).   Respiratory: Positive for cough (dry after getting stuck in rain just when lays down due to drainage) and shortness of breath (walking due to mask; also since LHC and stent never been able to get really good full deep breath ). Negative for chest tightness and wheezing.    Cardiovascular: Negative for chest pain, palpitations and leg swelling.   Gastrointestinal: Positive for diarrhea (after she eats within 1 hr she has diarrhea ). Negative for abdominal pain, blood in stool, constipation, nausea and vomiting.   Endocrine: Positive for heat intolerance (hot flashes ). Negative for cold intolerance.   Genitourinary: Positive for frequency (due to lasix ). Negative for difficulty urinating, dysuria, hematuria and urgency.   Musculoskeletal: Positive for back pain (left lower back when she stands alot ). Negative for joint swelling, neck pain and neck stiffness.   Skin: Negative for rash and wound.   Allergic/Immunologic: Positive for environmental allergies (seasonal ). Negative for food allergies.   Neurological: Positive for dizziness (after being put on Bp meds she is better). Negative for weakness, light-headedness, numbness and headaches.   Hematological: Bruises/bleeds easily (bruises ).   Psychiatric/Behavioral: Positive for sleep disturbance (hard to stay asleep she tosses and turns alot ).       Objective   /69   Pulse 71   Temp 96.4 °F (35.8 °C)   Ht 170.2 cm (67\")   Wt 96.2 kg (212 lb)   SpO2 96%   BMI 33.20 kg/m²   Vitals:    03/04/21 1008   BP: 115/69   Pulse: 71   Temp: 96.4 °F (35.8 °C)   SpO2: 96%   Weight: 96.2 kg (212 lb)   Height: 170.2 cm (67\")      Lab Results (most recent)     None        Physical Exam  Vitals signs reviewed. "   Constitutional:       General: She is awake.      Appearance: Normal appearance. She is well-developed and well-groomed. She is obese.   HENT:      Head: Normocephalic.   Eyes:      General: Lids are normal.   Neck:      Vascular: No carotid bruit, hepatojugular reflux or JVD.   Cardiovascular:      Rate and Rhythm: Normal rate and regular rhythm.      Pulses:           Radial pulses are 2+ on the right side and 2+ on the left side.        Dorsalis pedis pulses are 2+ on the right side and 2+ on the left side.        Posterior tibial pulses are 2+ on the right side and 2+ on the left side.      Heart sounds: Normal heart sounds.   Pulmonary:      Effort: Pulmonary effort is normal.      Breath sounds: Normal breath sounds and air entry.   Abdominal:      General: Bowel sounds are normal.      Palpations: Abdomen is soft.   Musculoskeletal:      Right lower leg: No edema.      Left lower leg: No edema.   Skin:     General: Skin is warm and dry.   Neurological:      Mental Status: She is alert and oriented to person, place, and time.   Psychiatric:         Attention and Perception: Attention and perception normal.         Mood and Affect: Mood and affect normal.         Speech: Speech normal.         Behavior: Behavior normal. Behavior is cooperative.         Thought Content: Thought content normal.         Cognition and Memory: Cognition and memory normal.         Judgment: Judgment normal.         Procedure     ECG 12 Lead    Date/Time: 3/4/2021 10:40 AM  Performed by: Nayely Ponce APRN  Authorized by: Nayely Ponce APRN   Comparison: compared with previous ECG from 1/9/2020  Similar to previous ECG  Rhythm: sinus rhythm  Rate: normal  BPM: 79  QRS axis: normal    Clinical impression: normal ECG                 Assessment/Plan      Diagnosis Plan   1. CAD in native artery  nitroglycerin (Nitrostat) 0.4 MG SL tablet    aspirin (aspirin) 81 MG EC tablet   2. Essential hypertension     3. Hyperlipidemia,  unspecified hyperlipidemia type     4. Peripheral edema     5. Dizziness     6. Shortness of breath     7. Anxiety  buPROPion XL (WELLBUTRIN XL) 300 MG 24 hr tablet   8. Dyslipidemia  atorvastatin (LIPITOR) 40 MG tablet    clopidogrel (PLAVIX) 75 MG tablet   9. Coronary artery disease involving native coronary artery with angina pectoris, unspecified whether native or transplanted heart (CMS/[facility])  atorvastatin (LIPITOR) 40 MG tablet    clopidogrel (PLAVIX) 75 MG tablet       Return in about 6 months (around 9/4/2021).    CAD - patient is on ASA and statin.  Hypertension - patient is on benazepril and doing well.  I did advise if dry cough does not resolve could be due to med and we will need to change, she will monitor.  Peripheral edema/DD I - patient uses lasix PRN.  Shortness of breath - stable.  Dyslipidemia - patient is on lipitor and doing well. She will continue her medication regimen.  She will follow-up in 6 mos or sooner if any changes.        Radha Van  reports that she has never smoked. She has never used smokeless tobacco..       Patient's Body mass index is 33.2 kg/m². BMI is above normal parameters. Recommendations include: educational material.      Electronically signed by:

## 2021-08-17 DIAGNOSIS — R60.9 PERIPHERAL EDEMA: ICD-10-CM

## 2021-08-17 RX ORDER — FUROSEMIDE 20 MG/1
20 TABLET ORAL DAILY
Qty: 90 TABLET | Refills: 3 | Status: SHIPPED | OUTPATIENT
Start: 2021-08-17 | End: 2021-09-08 | Stop reason: SDUPTHER

## 2021-08-19 DIAGNOSIS — R60.9 PERIPHERAL EDEMA: ICD-10-CM

## 2021-08-19 RX ORDER — POTASSIUM CHLORIDE 750 MG/1
TABLET, EXTENDED RELEASE ORAL
Qty: 90 TABLET | Refills: 3 | Status: SHIPPED | OUTPATIENT
Start: 2021-08-19 | End: 2021-09-08 | Stop reason: SDUPTHER

## 2021-09-01 DIAGNOSIS — I25.10 CAD IN NATIVE ARTERY: Chronic | ICD-10-CM

## 2021-09-01 RX ORDER — NITROGLYCERIN 0.4 MG/1
TABLET SUBLINGUAL
Qty: 25 TABLET | Refills: 3 | Status: SHIPPED | OUTPATIENT
Start: 2021-09-01 | End: 2021-09-08 | Stop reason: SDUPTHER

## 2021-09-08 ENCOUNTER — OFFICE VISIT (OUTPATIENT)
Dept: CARDIOLOGY | Facility: CLINIC | Age: 51
End: 2021-09-08

## 2021-09-08 VITALS
DIASTOLIC BLOOD PRESSURE: 76 MMHG | TEMPERATURE: 97.1 F | OXYGEN SATURATION: 95 % | BODY MASS INDEX: 33.74 KG/M2 | SYSTOLIC BLOOD PRESSURE: 126 MMHG | HEART RATE: 91 BPM | HEIGHT: 67 IN | WEIGHT: 215 LBS

## 2021-09-08 DIAGNOSIS — E78.5 DYSLIPIDEMIA: ICD-10-CM

## 2021-09-08 DIAGNOSIS — I10 ESSENTIAL HYPERTENSION: ICD-10-CM

## 2021-09-08 DIAGNOSIS — R06.02 SHORTNESS OF BREATH: ICD-10-CM

## 2021-09-08 DIAGNOSIS — F41.9 ANXIETY: Chronic | ICD-10-CM

## 2021-09-08 DIAGNOSIS — R60.9 PERIPHERAL EDEMA: ICD-10-CM

## 2021-09-08 DIAGNOSIS — I51.89 GRADE I DIASTOLIC DYSFUNCTION: ICD-10-CM

## 2021-09-08 DIAGNOSIS — I25.10 CAD IN NATIVE ARTERY: Primary | Chronic | ICD-10-CM

## 2021-09-08 DIAGNOSIS — E78.5 HYPERLIPIDEMIA, UNSPECIFIED HYPERLIPIDEMIA TYPE: ICD-10-CM

## 2021-09-08 DIAGNOSIS — I25.119 CORONARY ARTERY DISEASE INVOLVING NATIVE CORONARY ARTERY WITH ANGINA PECTORIS, UNSPECIFIED WHETHER NATIVE OR TRANSPLANTED HEART (HCC): ICD-10-CM

## 2021-09-08 PROCEDURE — 99214 OFFICE O/P EST MOD 30 MIN: CPT | Performed by: NURSE PRACTITIONER

## 2021-09-08 RX ORDER — POTASSIUM CHLORIDE 750 MG/1
10 TABLET, EXTENDED RELEASE ORAL DAILY
Qty: 90 TABLET | Refills: 3 | Status: SHIPPED | OUTPATIENT
Start: 2021-09-08 | End: 2022-05-19

## 2021-09-08 RX ORDER — ESCITALOPRAM OXALATE 10 MG/1
10 TABLET ORAL DAILY
Qty: 90 TABLET | Refills: 3 | Status: SHIPPED | OUTPATIENT
Start: 2021-09-08 | End: 2022-10-28 | Stop reason: SDUPTHER

## 2021-09-08 RX ORDER — ASPIRIN 81 MG/1
81 TABLET ORAL DAILY
Qty: 90 TABLET | Refills: 3 | Status: SHIPPED | OUTPATIENT
Start: 2021-09-08 | End: 2022-08-01

## 2021-09-08 RX ORDER — FUROSEMIDE 20 MG/1
20 TABLET ORAL DAILY
Qty: 90 TABLET | Refills: 3 | Status: SHIPPED | OUTPATIENT
Start: 2021-09-08 | End: 2021-11-11

## 2021-09-08 RX ORDER — ATORVASTATIN CALCIUM 40 MG/1
40 TABLET, FILM COATED ORAL EVERY EVENING
Qty: 90 TABLET | Refills: 3 | Status: SHIPPED | OUTPATIENT
Start: 2021-09-08 | End: 2022-11-28 | Stop reason: SDUPTHER

## 2021-09-08 RX ORDER — NITROGLYCERIN 0.4 MG/1
0.4 TABLET SUBLINGUAL
Qty: 25 TABLET | Refills: 3 | Status: SHIPPED | OUTPATIENT
Start: 2021-09-08 | End: 2022-09-28 | Stop reason: SDUPTHER

## 2021-09-08 RX ORDER — CLOPIDOGREL BISULFATE 75 MG/1
75 TABLET ORAL DAILY
Qty: 90 TABLET | Refills: 3 | Status: SHIPPED | OUTPATIENT
Start: 2021-09-08 | End: 2023-02-27 | Stop reason: SDUPTHER

## 2021-09-08 RX ORDER — BENAZEPRIL HYDROCHLORIDE 20 MG/1
20 TABLET ORAL DAILY
Qty: 90 TABLET | Refills: 3 | Status: SHIPPED | OUTPATIENT
Start: 2021-09-08 | End: 2022-09-28

## 2021-09-08 RX ORDER — BUPROPION HYDROCHLORIDE 300 MG/1
300 TABLET ORAL DAILY
Qty: 90 TABLET | Refills: 3 | Status: SHIPPED | OUTPATIENT
Start: 2021-09-08 | End: 2023-02-27 | Stop reason: SDUPTHER

## 2021-09-08 NOTE — PATIENT INSTRUCTIONS
Coronary Artery Disease, Female  Coronary artery disease (CAD) is a condition in which the arteries that lead to the heart (coronary arteries) become narrow or blocked. The narrowing or blockage can lead to decreased blood flow to the heart. Prolonged reduced blood flow can cause a heart attack (myocardial infarction or MI). This condition may also be called coronary heart disease.  Because CAD is the leading cause of death in women, it is important to understand what causes this condition and how it is treated.  What are the causes?  CAD is most often caused by atherosclerosis. This is the buildup of fat and cholesterol (plaque) on the inside of the arteries. Over time, the plaque may narrow or block the artery, reducing blood flow to the heart. Plaque can also become weak and break off within a coronary artery and cause a sudden blockage. Other less common causes of CAD include:  · A blood clot or a piece of a blood clot or other substance that blocks the flow of blood in a coronary artery (embolism).  · A tearing of the artery (spontaneous coronary artery dissection).  · An enlargement of an artery (aneurysm).  · Inflammation (vasculitis) in the artery wall.  What increases the risk?  The following factors may make you more likely to develop this condition:  · Age. Women over age 55 are at a greater risk of CAD.  · Family history of CAD.  · High blood pressure (hypertension).  · Diabetes.  · High cholesterol levels.  · Tobacco use.  · Lack of exercise.  · Menopause.  ? All postmenopausal women are at greater risk of CAD.  ? Women who have experienced menopause between the ages of 40-45 (early menopause) are at a higher risk of CAD.  ? Women who have experienced menopause before age 40 (premature menopause) are at a very high risk of CAD.  · Excessive alcohol use.  · A diet high in saturated and trans fats, such as fried food and processed meat.  Other possible risk factors include:  · High stress  levels.  · Depression.  · Obesity.  · Sleep apnea.  What are the signs or symptoms?  Many people do not have any symptoms during the early stages of CAD. As the condition progresses, symptoms may include:  · Chest pain (angina). The pain can:  ? Feel like crushing or squeezing, or like a tightness, pressure, fullness, or heaviness in the chest.  ? Last more than a few minutes or can stop and recur. The pain tends to get worse with exercise or stress and to fade with rest.  · Pain in the arms, neck, jaw, ear, or back.  · Unexplained heartburn or indigestion.  · Shortness of breath.  · Nausea.  · Sudden cold sweats.  · Sudden light-headedness.  · Fluttering or fast heartbeat (palpitations).  Many women have chest discomfort and the other symptoms. However, women often have unusual (atypical) symptoms, such as:  · Fatigue.  · Vomiting.  · Unexplained feelings of nervousness or anxiety.  · Unexplained weakness.  · Dizziness or fainting.  How is this diagnosed?  This condition is diagnosed based on:  · Your family and medical history.  · A physical exam.  · Tests, including:  ? A test to check the electrical signals in your heart (electrocardiogram).  ? Exercise stress test. This looks for signs of blockage when the heart is stressed with exercise, such as running on a treadmill.  ? Pharmacologic stress test. This test looks for signs of blockage when the heart is being stressed with a medicine.  ? Blood tests.  ? Coronary angiogram. This is a procedure to look at the coronary arteries to see if there is any blockage. During this test, a dye is injected into your arteries so they appear on an X-ray.  ? Coronary artery CT scan. This CT scan helps detect calcium deposits in your coronary arteries. Calcium deposits are an indicator of CAD.  ? A test that uses sound waves to take a picture of your heart (echocardiogram).  ? Chest X-ray.  How is this treated?  This condition may be treated by:  · Healthy lifestyle changes to  reduce risk factors.  · Medicines such as:  ? Antiplatelet medicines and blood-thinning medicines, such as aspirin. These help to prevent blood clots.  ? Nitroglycerin.  ? Blood pressure medicines.  ? Cholesterol-lowering medicine.  · Coronary angioplasty and stenting. During this procedure, a thin, flexible tube is inserted through a blood vessel and into a blocked artery. A balloon or similar device on the end of the tube is inflated to open up the artery. In some cases, a small, mesh tube (stent) is inserted into the artery to keep it open.  · Coronary artery bypass surgery. During this surgery, veins or arteries from other parts of the body are used to create a bypass around the blockage and allow blood to reach your heart.  Follow these instructions at home:  Medicines  · Take over-the-counter and prescription medicines only as told by your health care provider.  · Do not take the following medicines unless your health care provider approves:  ? NSAIDs, such as ibuprofen, naproxen, or celecoxib.  ? Vitamin supplements that contain vitamin A, vitamin E, or both.  ? Hormone replacement therapy that contains estrogen with or without progestin.  Lifestyle  · Follow an exercise program approved by your health care provider. Aim for 150 minutes of moderate exercise or 75 minutes of vigorous exercise each week.  · Maintain a healthy weight or lose weight as approved by your health care provider.  · Learn to manage stress or try to limit your stress. Ask your health care provider for suggestions if you need help.  · Get screened for depression and seek treatment, if needed.  · Do not use any products that contain nicotine or tobacco, such as cigarettes, e-cigarettes, and chewing tobacco. If you need help quitting, ask your health care provider.  · Do not use illegal drugs.  Eating and drinking    · Follow a heart-healthy diet. A dietitian can help educate you about healthy food options and changes. In general, eat  plenty of fruits and vegetables, lean meats, and whole grains.  · Avoid foods high in:  ? Sugar.  ? Salt (sodium).  ? Saturated fats, such as processed or fatty meat.  ? Trans fats, such as fried food.  · Use healthy cooking methods such as roasting, grilling, broiling, baking, poaching, steaming, or stir-frying.  · Do not drink alcohol if:  ? Your health care provider tells you not to drink.  ? You are pregnant, may be pregnant, or are planning to become pregnant.  · If you drink alcohol:  ? Limit how much you have to 0-1 drink a day.  ? Be aware of how much alcohol is in your drink. In the [place] one drink equals one 12 oz bottle of beer (355 mL), one 5 oz glass of wine (148 mL), or one 1½ oz glass of hard liquor (44 mL).    General instructions  · Manage any other health conditions, such as hypertension and diabetes. These conditions affect your heart.  · Your health care provider may ask you to monitor your blood pressure. Ideally, your blood pressure should be below 130/80.  · Keep all follow-up visits as told by your health care provider. This is important.  Get help right away if:  · You have pain in your chest, neck, ear, arm, jaw, stomach, or back that:  ? Lasts more than a few minutes.  ? Is recurring.  ? Is not relieved by taking medicine under your tongue (sublingual nitroglycerin).  · You have profuse sweating without cause.  · You have unexplained:  ? Heartburn or indigestion.  ? Shortness of breath or difficulty breathing.  ? Fluttering or fast heartbeat (palpitations).  ? Nausea or vomiting.  ? Fatigue.  ? Feelings of nervousness or anxiety.  ? Weakness.  ? Diarrhea.  · You have sudden light-headedness or dizziness.  · You faint.  · You feel like hurting yourself or think about taking your own life.  These symptoms may represent a serious problem that is an emergency. Do not wait to see if the symptoms will go away. Get medical help right away. Call your local emergency services (911 in the [place] Do  not drive yourself to the hospital.  Summary  · Coronary artery disease (CAD) is a condition in which the arteries that lead to the heart (coronary arteries) become narrow or blocked. The narrowing or blockage can lead to a heart attack.  · Many women have chest discomfort and other common symptoms of CAD. However, women often have unusual (atypical) symptoms, such as fatigue, vomiting, weakness, or dizziness.  · CAD can be treated with lifestyle changes, medicines, surgery, or a combination of these treatments.  This information is not intended to replace advice given to you by your health care provider. Make sure you discuss any questions you have with your health care provider.  Document Revised: 09/06/2019 Document Reviewed: 08/27/2019  [employer] Patient Education © 2021 [employer]

## 2021-09-08 NOTE — PROGRESS NOTES
Subjective   Radha Van is a 51 y.o. female     Chief Complaint   Patient presents with   • Follow-up   • CAD in native artery       HPI    Problem List:    1. CAD  1.1 stress test 11/2/15-large dense anterior ischemic defect, intermediate risk  1.2 LHC 1/7/16 - Stent LAD  1.3 stress test 10/9/18-low risk, no ischemia  2. Hypertension  3. Dyspnea  3.1 Echo 11/2/15 - mild LVH; EF > 65%; DD I; trace MR & TR; mild NM; PA 20-25  3.2 Echo 10/9/18-mild LVH, diastolic dysfunction 1, EF 66-70%, trace TR, trace MR  4. Edema  5. Anxiety       6. Dyslipidemia       7. Event Monitor 7/23-8/5/2020 - NSR, ST      8. Moderna Vaccination 1/2021; 2/2021    Patient is a 51-year-old female who presents today for follow-up.  She denies any chest pain, pressure, palpitations, fluttering, dizziness, presyncope, syncope, orthopnea, PND or edema.  Patient says that she does have shortness of breath only if she is doing a lot of walking or rushing.  She says sometimes it just feels like she cannot get a good breath but she thinks it is anxiety related.  She says she will just be in [employer] and kind of feel overwhelmed and have to go.  She said when she had her previous stent she had jaw pain and she has not had any of that.  She has not had any recent blood work.  Her son is getting  at the end of the month.    Current Outpatient Medications on File Prior to Visit   Medication Sig Dispense Refill   • BIOTIN PO Take  by mouth Daily.     • [DISCONTINUED] aspirin (aspirin) 81 MG EC tablet Take 1 tablet by mouth Daily. 90 tablet 3   • [DISCONTINUED] atorvastatin (LIPITOR) 40 MG tablet Take 1 tablet by mouth Every Evening. 90 tablet 3   • [DISCONTINUED] benazepril (LOTENSIN) 20 MG tablet Take 20 mg by mouth Daily.     • [DISCONTINUED] buPROPion XL (WELLBUTRIN XL) 300 MG 24 hr tablet Take 1 tablet by mouth Daily. 90 tablet 3   • [DISCONTINUED] clopidogrel (PLAVIX) 75 MG tablet Take 1 tablet by mouth Daily. 90 tablet 3   •  [DISCONTINUED] escitalopram (Lexapro) 10 MG tablet Take 10 mg by mouth Daily.     • [DISCONTINUED] furosemide (LASIX) 20 MG tablet Take 1 tablet by mouth Daily. 90 tablet 3   • [DISCONTINUED] nitroglycerin (NITROSTAT) 0.4 MG SL tablet PLACE 1 TABLET UNDER THE TONGUE EVERY 5 MINUTES AS NEEDED FOR CHEST PAIN FOR UP TO 3 DOSES 25 tablet 3   • [DISCONTINUED] potassium chloride (K-DUR,KLOR-CON) 10 MEQ CR tablet TAKE 1 TABLET BY MOUTH EVERY DAY 90 tablet 3     No current facility-administered medications on file prior to visit.       ALLERGIES    Erythromycin    Past Medical History:   Diagnosis Date   • Ankle fracture, right     She does have edema in her RLE due to cracked bone in her right ankle. Patient says she fell down stairs. She is suppose to have a boot on, but because of the weather she wore a sneaker. Patient says that she had just started to exercise again when this happened. Foot has been painful today.   • Anxiety    • CAD in native artery     1.1. [facility] 1/7/16 - Stent LAD   • Chest pain    • COVID-19 vaccine administered     1st - 01/13/2021 , 2nd -02/12/21 - Moderna    • Diverticulitis    • Dyspnea     3.1 Echo 11/2/15 - mild LVH; EF > 65%; DD I; trace MR & TR; mild ME; PA 20-25   • Edema     Related to ankle fracture.   • Hyperlipidemia    • Hypertension     The patient presents for follow-up of primary hypertension. The patient states she has been stable with her blood pressure control since the last visit. Comorbid Illnesses: coronary artery disease.    • Obesity     BMI 31.95.       Social History     Socioeconomic History   • Marital status:      Spouse name: Not on file   • Number of children: Not on file   • Years of education: Not on file   • Highest education level: Not on file   Tobacco Use   • Smoking status: Never Smoker   • Smokeless tobacco: Never Used   Substance and Sexual Activity   • Alcohol use: No   • Drug use: No   • Sexual activity: Defer       Family History   Problem Relation  "Age of Onset   • Colon cancer Other    • Stroke Other         Total of 3   • Hypertension Other    • Heart attack Other    • Other Other         High cholesterol & Heart Surgery   • Lupus Other         Systemic lupus erythematosus       Review of Systems   Constitutional: Negative for appetite change, chills, fatigue and fever.   HENT: Negative for congestion, rhinorrhea and sore throat.    Eyes: Positive for visual disturbance (reading glasses ).   Respiratory: Positive for shortness of breath (when she is doing alot of walking , or rushing.  feels like she cannot get a good breath in at times, thinks anxiety). Negative for cough, chest tightness and wheezing.    Cardiovascular: Negative for chest pain, palpitations and leg swelling.   Gastrointestinal: Negative for abdominal pain, blood in stool, constipation, diarrhea, nausea and vomiting.   Endocrine: Positive for cold intolerance (cold sweats at times when she gets nervous ) and heat intolerance (Night sweats , Hot flahses ).   Genitourinary: Positive for frequency (due to water pill ). Negative for difficulty urinating, dysuria, hematuria and urgency.   Musculoskeletal: Positive for back pain (lower left side ). Negative for arthralgias, joint swelling, neck pain and neck stiffness.   Skin: Negative for color change, pallor, rash and wound.   Allergic/Immunologic: Positive for environmental allergies (seasonal ). Negative for food allergies.   Neurological: Positive for headaches (due to Sinuis ). Negative for dizziness, weakness, light-headedness and numbness.   Hematological: Bruises/bleeds easily (Both ).   Psychiatric/Behavioral: Positive for sleep disturbance (hard to stay asleep she wakes up and has a hard time getting back to sleep , she does alot of tossing and turning ).       Objective   /76 (BP Location: Left arm)   Pulse 91   Temp 97.1 °F (36.2 °C)   Ht 170.2 cm (67\")   Wt 97.5 kg (215 lb)   SpO2 95%   BMI 33.67 kg/m²   Vitals:    " "09/08/21 1437   BP: 126/76   BP Location: Left arm   Pulse: 91   Temp: 97.1 °F (36.2 °C)   SpO2: 95%   Weight: 97.5 kg (215 lb)   Height: 170.2 cm (67\")      Lab Results (most recent)     None        Physical Exam  Vitals reviewed.   Constitutional:       General: She is awake.      Appearance: Normal appearance. She is well-developed and well-groomed. She is obese.   HENT:      Head: Normocephalic.   Eyes:      General: Lids are normal.   Neck:      Vascular: No carotid bruit, hepatojugular reflux or JVD.   Cardiovascular:      Rate and Rhythm: Normal rate and regular rhythm.      Pulses:           Radial pulses are 2+ on the right side and 2+ on the left side.        Dorsalis pedis pulses are 2+ on the right side and 2+ on the left side.        Posterior tibial pulses are 2+ on the right side and 2+ on the left side.      Heart sounds: Normal heart sounds.   Pulmonary:      Effort: Pulmonary effort is normal.      Breath sounds: Normal breath sounds and air entry.   Abdominal:      General: Bowel sounds are normal.      Palpations: Abdomen is soft.   Musculoskeletal:      Right lower leg: No edema.      Left lower leg: No edema.   Skin:     General: Skin is warm and dry.   Neurological:      Mental Status: She is alert and oriented to person, place, and time.   Psychiatric:         Attention and Perception: Attention and perception normal.         Mood and Affect: Mood and affect normal.         Speech: Speech normal.         Behavior: Behavior normal. Behavior is cooperative.         Thought Content: Thought content normal.         Cognition and Memory: Cognition and memory normal.         Judgment: Judgment normal.         Procedure   Procedures         Assessment/Plan      Diagnosis Plan   1. CAD in native artery  aspirin (aspirin) 81 MG EC tablet    nitroglycerin (NITROSTAT) 0.4 MG SL tablet    Comprehensive Metabolic Panel    Lipid Panel    TSH    T3, Free    T4, Free   2. Essential hypertension  benazepril " (LOTENSIN) 20 MG tablet    CBC & Differential    TSH    T3, Free    T4, Free   3. Hyperlipidemia, unspecified hyperlipidemia type  Lipid Panel   4. Peripheral edema  furosemide (LASIX) 20 MG tablet    potassium chloride (K-DUR,KLOR-CON) 10 MEQ CR tablet   5. Shortness of breath     6. Dyslipidemia  atorvastatin (LIPITOR) 40 MG tablet    clopidogrel (PLAVIX) 75 MG tablet   7. Coronary artery disease involving native coronary artery with angina pectoris, unspecified whether native or transplanted heart (CMS/[facility])  atorvastatin (LIPITOR) 40 MG tablet    clopidogrel (PLAVIX) 75 MG tablet   8. Anxiety  buPROPion XL (WELLBUTRIN XL) 300 MG 24 hr tablet    TSH    T3, Free    T4, Free   9. Grade I diastolic dysfunction         Return in about 6 months (around 3/8/2022).  CAD-patient's on aspirin, statin and Plavix.  Hypertension-patient is on benazepril and doing well.  Hyperlipidemia-patient is on Lipitor.  Peripheral edema/diastolic dysfunction-patient uses Lasix as needed.  Shortness of breath-stable.  Anxiety-refill patient's Wellbutrin.  She will get lipid, CBC, CMP, TSH, free T3 and free T4.  She will continue her medication regimen.  She will follow-up in 6 months or sooner if any changes.         Radha Van  reports that she has never smoked. She has never used smokeless tobacco.. Patient did not bring med list or medicine bottles to appointment, med list has been reviewed and updated based on patient's knowledge of their meds.     Electronically signed by:

## 2021-09-16 ENCOUNTER — TELEPHONE (OUTPATIENT)
Dept: CARDIOLOGY | Facility: CLINIC | Age: 51
End: 2021-09-16

## 2021-09-16 ENCOUNTER — LAB (OUTPATIENT)
Dept: LAB | Facility: HOSPITAL | Age: 51
End: 2021-09-16

## 2021-09-16 DIAGNOSIS — E78.5 HYPERLIPIDEMIA, UNSPECIFIED HYPERLIPIDEMIA TYPE: ICD-10-CM

## 2021-09-16 DIAGNOSIS — F41.9 ANXIETY: Chronic | ICD-10-CM

## 2021-09-16 DIAGNOSIS — I25.10 CAD IN NATIVE ARTERY: Chronic | ICD-10-CM

## 2021-09-16 DIAGNOSIS — I10 ESSENTIAL HYPERTENSION: ICD-10-CM

## 2021-09-16 LAB
ALBUMIN SERPL-MCNC: 4.11 G/DL (ref 3.5–5.2)
ALBUMIN/GLOB SERPL: 1.9 G/DL
ALP SERPL-CCNC: 69 U/L (ref 39–117)
ALT SERPL W P-5'-P-CCNC: 14 U/L (ref 1–33)
ANION GAP SERPL CALCULATED.3IONS-SCNC: 10.5 MMOL/L (ref 5–15)
AST SERPL-CCNC: 13 U/L (ref 1–32)
BASOPHILS # BLD AUTO: 0.06 10*3/MM3 (ref 0–0.2)
BASOPHILS NFR BLD AUTO: 1.1 % (ref 0–1.5)
BILIRUB SERPL-MCNC: 0.2 MG/DL (ref 0–1.2)
BUN SERPL-MCNC: 15 MG/DL (ref 6–20)
BUN/CREAT SERPL: 15.5 (ref 7–25)
CALCIUM SPEC-SCNC: 8.9 MG/DL (ref 8.6–10.5)
CHLORIDE SERPL-SCNC: 106 MMOL/L (ref 98–107)
CHOLEST SERPL-MCNC: 132 MG/DL (ref 0–200)
CO2 SERPL-SCNC: 24.5 MMOL/L (ref 22–29)
CREAT SERPL-MCNC: 0.97 MG/DL (ref 0.57–1)
DEPRECATED RDW RBC AUTO: 46.3 FL (ref 37–54)
EOSINOPHIL # BLD AUTO: 0.13 10*3/MM3 (ref 0–0.4)
EOSINOPHIL NFR BLD AUTO: 2.3 % (ref 0.3–6.2)
ERYTHROCYTE [DISTWIDTH] IN BLOOD BY AUTOMATED COUNT: 13.5 % (ref 12.3–15.4)
GFR SERPL CREATININE-BSD FRML MDRD: 61 ML/MIN/1.73
GLOBULIN UR ELPH-MCNC: 2.2 GM/DL
GLUCOSE SERPL-MCNC: 94 MG/DL (ref 65–99)
HCT VFR BLD AUTO: 29.1 % (ref 34–46.6)
HDLC SERPL-MCNC: 44 MG/DL (ref 40–60)
HGB BLD-MCNC: 9.2 G/DL (ref 12–15.9)
IMM GRANULOCYTES # BLD AUTO: 0.01 10*3/MM3 (ref 0–0.05)
IMM GRANULOCYTES NFR BLD AUTO: 0.2 % (ref 0–0.5)
LDLC SERPL CALC-MCNC: 69 MG/DL (ref 0–100)
LDLC/HDLC SERPL: 1.53 {RATIO}
LYMPHOCYTES # BLD AUTO: 1.35 10*3/MM3 (ref 0.7–3.1)
LYMPHOCYTES NFR BLD AUTO: 24.3 % (ref 19.6–45.3)
MCH RBC QN AUTO: 29.3 PG (ref 26.6–33)
MCHC RBC AUTO-ENTMCNC: 31.6 G/DL (ref 31.5–35.7)
MCV RBC AUTO: 92.7 FL (ref 79–97)
MONOCYTES # BLD AUTO: 0.44 10*3/MM3 (ref 0.1–0.9)
MONOCYTES NFR BLD AUTO: 7.9 % (ref 5–12)
NEUTROPHILS NFR BLD AUTO: 3.56 10*3/MM3 (ref 1.7–7)
NEUTROPHILS NFR BLD AUTO: 64.2 % (ref 42.7–76)
NRBC BLD AUTO-RTO: 0 /100 WBC (ref 0–0.2)
PLATELET # BLD AUTO: 296 10*3/MM3 (ref 140–450)
PMV BLD AUTO: 10.1 FL (ref 6–12)
POTASSIUM SERPL-SCNC: 4.1 MMOL/L (ref 3.5–5.2)
PROT SERPL-MCNC: 6.3 G/DL (ref 6–8.5)
RBC # BLD AUTO: 3.14 10*6/MM3 (ref 3.77–5.28)
SODIUM SERPL-SCNC: 141 MMOL/L (ref 136–145)
T4 FREE SERPL-MCNC: 1.27 NG/DL (ref 0.93–1.7)
TRIGL SERPL-MCNC: 103 MG/DL (ref 0–150)
TSH SERPL DL<=0.05 MIU/L-ACNC: 1.54 UIU/ML (ref 0.27–4.2)
VLDLC SERPL-MCNC: 19 MG/DL (ref 5–40)
WBC # BLD AUTO: 5.55 10*3/MM3 (ref 3.4–10.8)

## 2021-09-16 PROCEDURE — 36415 COLL VENOUS BLD VENIPUNCTURE: CPT

## 2021-09-16 PROCEDURE — 80053 COMPREHEN METABOLIC PANEL: CPT

## 2021-09-16 PROCEDURE — 84443 ASSAY THYROID STIM HORMONE: CPT

## 2021-09-16 PROCEDURE — 85025 COMPLETE CBC W/AUTO DIFF WBC: CPT

## 2021-09-16 PROCEDURE — 80061 LIPID PANEL: CPT

## 2021-09-16 PROCEDURE — 84481 FREE ASSAY (FT-3): CPT

## 2021-09-16 PROCEDURE — 84439 ASSAY OF FREE THYROXINE: CPT

## 2021-09-16 NOTE — TELEPHONE ENCOUNTER
----- Message from DEVI Centeno sent at 9/16/2021  4:08 PM EDT -----  Please advise patient, HGB is on the low side.  Forwarded labs to PCP.

## 2021-09-16 NOTE — TELEPHONE ENCOUNTER
Pt was advised of lab results :    TSH   0.270 - 4.200 uIU/mL 1.540      Creatinine   0.57 - 1.00 mg/dL 0.97      LDL Cholesterol    0 - 100 mg/dL 69      HDL Cholesterol   40 - 60 mg/dL 44      Triglycerides   0 - 150 mg/dL 103      Hemoglobin   12.0 - 15.9 g/dL 9.2Low

## 2021-09-17 LAB — T3FREE SERPL-MCNC: 3.03 PG/ML (ref 2–4.4)

## 2021-11-11 DIAGNOSIS — R60.9 PERIPHERAL EDEMA: ICD-10-CM

## 2021-11-11 RX ORDER — FUROSEMIDE 20 MG/1
TABLET ORAL
Qty: 90 TABLET | Refills: 3 | Status: SHIPPED | OUTPATIENT
Start: 2021-11-11 | End: 2022-11-18

## 2022-05-19 DIAGNOSIS — R60.9 PERIPHERAL EDEMA: ICD-10-CM

## 2022-05-19 RX ORDER — POTASSIUM CHLORIDE 750 MG/1
TABLET, EXTENDED RELEASE ORAL
Qty: 90 TABLET | Refills: 3 | Status: SHIPPED | OUTPATIENT
Start: 2022-05-19

## 2022-07-30 DIAGNOSIS — I25.10 CAD IN NATIVE ARTERY: Chronic | ICD-10-CM

## 2022-08-01 RX ORDER — ASPIRIN 81 MG/1
TABLET ORAL
Qty: 90 TABLET | Refills: 3 | Status: SHIPPED | OUTPATIENT
Start: 2022-08-01

## 2022-09-28 ENCOUNTER — TELEPHONE (OUTPATIENT)
Dept: CARDIOLOGY | Facility: CLINIC | Age: 52
End: 2022-09-28

## 2022-09-28 ENCOUNTER — OFFICE VISIT (OUTPATIENT)
Dept: CARDIOLOGY | Facility: CLINIC | Age: 52
End: 2022-09-28

## 2022-09-28 ENCOUNTER — LAB (OUTPATIENT)
Dept: CARDIOLOGY | Facility: CLINIC | Age: 52
End: 2022-09-28

## 2022-09-28 VITALS
TEMPERATURE: 98.1 F | WEIGHT: 212 LBS | OXYGEN SATURATION: 99 % | HEIGHT: 67 IN | HEART RATE: 75 BPM | DIASTOLIC BLOOD PRESSURE: 79 MMHG | BODY MASS INDEX: 33.27 KG/M2 | SYSTOLIC BLOOD PRESSURE: 117 MMHG

## 2022-09-28 DIAGNOSIS — R06.02 SHORTNESS OF BREATH: ICD-10-CM

## 2022-09-28 DIAGNOSIS — R53.81 MALAISE AND FATIGUE: ICD-10-CM

## 2022-09-28 DIAGNOSIS — I10 PRIMARY HYPERTENSION: ICD-10-CM

## 2022-09-28 DIAGNOSIS — I25.10 CAD IN NATIVE ARTERY: Primary | Chronic | ICD-10-CM

## 2022-09-28 DIAGNOSIS — I51.89 GRADE I DIASTOLIC DYSFUNCTION: ICD-10-CM

## 2022-09-28 DIAGNOSIS — E78.5 HYPERLIPIDEMIA, UNSPECIFIED HYPERLIPIDEMIA TYPE: ICD-10-CM

## 2022-09-28 DIAGNOSIS — R53.83 MALAISE AND FATIGUE: ICD-10-CM

## 2022-09-28 DIAGNOSIS — R60.9 PERIPHERAL EDEMA: ICD-10-CM

## 2022-09-28 DIAGNOSIS — R00.2 PALPITATIONS: ICD-10-CM

## 2022-09-28 LAB
ALBUMIN SERPL-MCNC: 4.56 G/DL (ref 3.5–5.2)
ALBUMIN/GLOB SERPL: 1.9 G/DL
ALP SERPL-CCNC: 88 U/L (ref 39–117)
ALT SERPL W P-5'-P-CCNC: 15 U/L (ref 1–33)
ANION GAP SERPL CALCULATED.3IONS-SCNC: 13.5 MMOL/L (ref 5–15)
AST SERPL-CCNC: 13 U/L (ref 1–32)
BASOPHILS # BLD AUTO: 0.09 10*3/MM3 (ref 0–0.2)
BASOPHILS NFR BLD AUTO: 1.8 % (ref 0–1.5)
BILIRUB SERPL-MCNC: 0.3 MG/DL (ref 0–1.2)
BUN SERPL-MCNC: 13 MG/DL (ref 6–20)
BUN/CREAT SERPL: 13.3 (ref 7–25)
CALCIUM SPEC-SCNC: 9.5 MG/DL (ref 8.6–10.5)
CHLORIDE SERPL-SCNC: 105 MMOL/L (ref 98–107)
CHOLEST SERPL-MCNC: 174 MG/DL (ref 0–200)
CO2 SERPL-SCNC: 23.5 MMOL/L (ref 22–29)
CREAT SERPL-MCNC: 0.98 MG/DL (ref 0.57–1)
DEPRECATED RDW RBC AUTO: 46.8 FL (ref 37–54)
EGFRCR SERPLBLD CKD-EPI 2021: 69.6 ML/MIN/1.73
EOSINOPHIL # BLD AUTO: 0.11 10*3/MM3 (ref 0–0.4)
EOSINOPHIL NFR BLD AUTO: 2.2 % (ref 0.3–6.2)
ERYTHROCYTE [DISTWIDTH] IN BLOOD BY AUTOMATED COUNT: 16.5 % (ref 12.3–15.4)
GLOBULIN UR ELPH-MCNC: 2.4 GM/DL
GLUCOSE SERPL-MCNC: 101 MG/DL (ref 65–99)
HCT VFR BLD AUTO: 36.8 % (ref 34–46.6)
HDLC SERPL-MCNC: 60 MG/DL (ref 40–60)
HGB BLD-MCNC: 10.8 G/DL (ref 12–15.9)
IMM GRANULOCYTES # BLD AUTO: 0.01 10*3/MM3 (ref 0–0.05)
IMM GRANULOCYTES NFR BLD AUTO: 0.2 % (ref 0–0.5)
LDLC SERPL CALC-MCNC: 96 MG/DL (ref 0–100)
LDLC/HDLC SERPL: 1.58 {RATIO}
LYMPHOCYTES # BLD AUTO: 1.4 10*3/MM3 (ref 0.7–3.1)
LYMPHOCYTES NFR BLD AUTO: 27.8 % (ref 19.6–45.3)
MCH RBC QN AUTO: 23.4 PG (ref 26.6–33)
MCHC RBC AUTO-ENTMCNC: 29.3 G/DL (ref 31.5–35.7)
MCV RBC AUTO: 79.8 FL (ref 79–97)
MONOCYTES # BLD AUTO: 0.35 10*3/MM3 (ref 0.1–0.9)
MONOCYTES NFR BLD AUTO: 6.9 % (ref 5–12)
NEUTROPHILS NFR BLD AUTO: 3.08 10*3/MM3 (ref 1.7–7)
NEUTROPHILS NFR BLD AUTO: 61.1 % (ref 42.7–76)
NRBC BLD AUTO-RTO: 0 /100 WBC (ref 0–0.2)
PLATELET # BLD AUTO: 411 10*3/MM3 (ref 140–450)
PMV BLD AUTO: 10.4 FL (ref 6–12)
POTASSIUM SERPL-SCNC: 4.4 MMOL/L (ref 3.5–5.2)
PROT SERPL-MCNC: 7 G/DL (ref 6–8.5)
RBC # BLD AUTO: 4.61 10*6/MM3 (ref 3.77–5.28)
SODIUM SERPL-SCNC: 142 MMOL/L (ref 136–145)
T4 FREE SERPL-MCNC: 1.23 NG/DL (ref 0.93–1.7)
TRIGL SERPL-MCNC: 97 MG/DL (ref 0–150)
TSH SERPL DL<=0.05 MIU/L-ACNC: 2.31 UIU/ML (ref 0.27–4.2)
VLDLC SERPL-MCNC: 18 MG/DL (ref 5–40)
WBC NRBC COR # BLD: 5.04 10*3/MM3 (ref 3.4–10.8)

## 2022-09-28 PROCEDURE — 80061 LIPID PANEL: CPT | Performed by: NURSE PRACTITIONER

## 2022-09-28 PROCEDURE — 82652 VIT D 1 25-DIHYDROXY: CPT | Performed by: NURSE PRACTITIONER

## 2022-09-28 PROCEDURE — 84481 FREE ASSAY (FT-3): CPT | Performed by: NURSE PRACTITIONER

## 2022-09-28 PROCEDURE — 82607 VITAMIN B-12: CPT | Performed by: NURSE PRACTITIONER

## 2022-09-28 PROCEDURE — 93000 ELECTROCARDIOGRAM COMPLETE: CPT | Performed by: NURSE PRACTITIONER

## 2022-09-28 PROCEDURE — 84439 ASSAY OF FREE THYROXINE: CPT | Performed by: NURSE PRACTITIONER

## 2022-09-28 PROCEDURE — 80050 GENERAL HEALTH PANEL: CPT | Performed by: NURSE PRACTITIONER

## 2022-09-28 PROCEDURE — 99214 OFFICE O/P EST MOD 30 MIN: CPT | Performed by: NURSE PRACTITIONER

## 2022-09-28 RX ORDER — BENAZEPRIL HYDROCHLORIDE 20 MG/1
20 TABLET ORAL DAILY PRN
Qty: 90 TABLET | Refills: 3
Start: 2022-09-28

## 2022-09-28 RX ORDER — NITROGLYCERIN 0.4 MG/1
0.4 TABLET SUBLINGUAL
Qty: 45 TABLET | Refills: 3 | Status: SHIPPED | OUTPATIENT
Start: 2022-09-28

## 2022-09-28 RX ORDER — TOPIRAMATE 25 MG/1
25 CAPSULE, COATED PELLETS ORAL 2 TIMES DAILY
COMMUNITY

## 2022-09-28 NOTE — TELEPHONE ENCOUNTER
----- Message from DEVI Centeno sent at 9/28/2022  3:15 PM EDT -----  Please advise patient her creatinine, potassium, LDL, triglycerides, free T4, TSH are all within normal range.  Her hemoglobin is slightly low.  Does look like she has some anemia.  Have her follow-up with Dr. Walker so that he could address the anemia.  Labs have been forwarded.  Her LDL is within normal range but should be a little lower since she does have known coronary artery disease.  Is she taking her Lipitor as prescribed?.  If she is have her watch red meat or fried food intake.  We will recheck him at next follow-up.        Pt advised of the above mess / lab results     Labs - all other labs WNL pt was advised that her LDL was good but the goal is closer to 70 instead of 96 she will watch her red meats and fried foods . Pending labs pt will be notified of the labs once we receive them     Hemoglobin   12.0 - 15.9 g/dL 10.8 Low       LDL Cholesterol    0 - 100 mg/dL 96      SHIRA Irby    Pt was advised of pending labs ( Normal )    Vit B12 and Free T3   09/29/2022

## 2022-09-29 LAB
T3FREE SERPL-MCNC: 2.91 PG/ML (ref 2–4.4)
VIT B12 BLD-MCNC: 468 PG/ML (ref 211–946)

## 2022-09-30 LAB — 1,25(OH)2D SERPL-MCNC: 59 PG/ML (ref 24.8–81.5)

## 2022-10-28 RX ORDER — ESCITALOPRAM OXALATE 10 MG/1
10 TABLET ORAL DAILY
Qty: 90 TABLET | Refills: 3 | Status: SHIPPED | OUTPATIENT
Start: 2022-10-28

## 2022-11-18 DIAGNOSIS — R60.9 PERIPHERAL EDEMA: ICD-10-CM

## 2022-11-18 RX ORDER — FUROSEMIDE 20 MG/1
TABLET ORAL
Qty: 90 TABLET | Refills: 3 | Status: SHIPPED | OUTPATIENT
Start: 2022-11-18

## 2022-11-28 DIAGNOSIS — E78.5 DYSLIPIDEMIA: ICD-10-CM

## 2022-11-28 DIAGNOSIS — I25.119 CORONARY ARTERY DISEASE INVOLVING NATIVE CORONARY ARTERY WITH ANGINA PECTORIS, UNSPECIFIED WHETHER NATIVE OR TRANSPLANTED HEART: ICD-10-CM

## 2022-11-28 RX ORDER — ATORVASTATIN CALCIUM 40 MG/1
40 TABLET, FILM COATED ORAL EVERY EVENING
Qty: 90 TABLET | Refills: 3 | Status: SHIPPED | OUTPATIENT
Start: 2022-11-28

## 2022-12-07 ENCOUNTER — APPOINTMENT (OUTPATIENT)
Dept: CARDIOLOGY | Facility: HOSPITAL | Age: 52
End: 2022-12-07

## 2023-01-04 ENCOUNTER — APPOINTMENT (OUTPATIENT)
Dept: CARDIOLOGY | Facility: HOSPITAL | Age: 53
End: 2023-01-04
Payer: COMMERCIAL

## 2023-01-12 ENCOUNTER — TELEPHONE (OUTPATIENT)
Dept: CARDIOLOGY | Facility: CLINIC | Age: 53
End: 2023-01-12
Payer: COMMERCIAL

## 2023-01-12 NOTE — TELEPHONE ENCOUNTER
I called patient to confirm her appointment for tomorrow, I realized she did not have her stress test done. I asked if she would like to R/S her testing and she did not. She had routine f/u in September.     Patient has agreed to R/S appointment with Nayely since she is not having any cardiac related symptoms currently.      She knows to go to the ER with any new or worsening symptoms

## 2023-02-27 DIAGNOSIS — E78.5 DYSLIPIDEMIA: ICD-10-CM

## 2023-02-27 DIAGNOSIS — F41.9 ANXIETY: Chronic | ICD-10-CM

## 2023-02-27 DIAGNOSIS — I25.119 CORONARY ARTERY DISEASE INVOLVING NATIVE CORONARY ARTERY WITH ANGINA PECTORIS, UNSPECIFIED WHETHER NATIVE OR TRANSPLANTED HEART: ICD-10-CM

## 2023-02-27 RX ORDER — BUPROPION HYDROCHLORIDE 300 MG/1
300 TABLET ORAL DAILY
Qty: 90 TABLET | Refills: 3 | Status: SHIPPED | OUTPATIENT
Start: 2023-02-27

## 2023-02-27 RX ORDER — CLOPIDOGREL BISULFATE 75 MG/1
75 TABLET ORAL DAILY
Qty: 90 TABLET | Refills: 3 | Status: SHIPPED | OUTPATIENT
Start: 2023-02-27

## 2023-05-19 DIAGNOSIS — R60.9 PERIPHERAL EDEMA: ICD-10-CM

## 2023-05-19 RX ORDER — POTASSIUM CHLORIDE 750 MG/1
10 TABLET, EXTENDED RELEASE ORAL DAILY
Qty: 90 TABLET | Refills: 3 | Status: SHIPPED | OUTPATIENT
Start: 2023-05-19

## 2023-08-15 ENCOUNTER — OFFICE VISIT (OUTPATIENT)
Dept: CARDIOLOGY | Facility: CLINIC | Age: 53
End: 2023-08-15
Payer: COMMERCIAL

## 2023-08-15 VITALS
SYSTOLIC BLOOD PRESSURE: 123 MMHG | DIASTOLIC BLOOD PRESSURE: 70 MMHG | BODY MASS INDEX: 33.74 KG/M2 | HEIGHT: 67 IN | WEIGHT: 215 LBS | OXYGEN SATURATION: 96 % | HEART RATE: 93 BPM

## 2023-08-15 DIAGNOSIS — I25.119 CORONARY ARTERY DISEASE INVOLVING NATIVE CORONARY ARTERY WITH ANGINA PECTORIS, UNSPECIFIED WHETHER NATIVE OR TRANSPLANTED HEART: Primary | ICD-10-CM

## 2023-08-15 DIAGNOSIS — E78.5 DYSLIPIDEMIA: ICD-10-CM

## 2023-08-15 DIAGNOSIS — I10 PRIMARY HYPERTENSION: ICD-10-CM

## 2023-08-15 PROCEDURE — 99213 OFFICE O/P EST LOW 20 MIN: CPT | Performed by: PHYSICIAN ASSISTANT

## 2023-08-15 RX ORDER — POTASSIUM CHLORIDE 750 MG/1
10 TABLET, FILM COATED, EXTENDED RELEASE ORAL 2 TIMES DAILY
COMMUNITY

## 2023-08-15 RX ORDER — FUROSEMIDE 20 MG/1
20 TABLET ORAL 2 TIMES DAILY
COMMUNITY

## 2023-08-15 NOTE — PROGRESS NOTES
Problem list     Subjective   Radha Van is a 52 y.o. female     Chief Complaint   Patient presents with    Follow-up     ROUTINE   Problem List:     1. CAD  1.1 stress test 11/2/15-large dense anterior ischemic defect, intermediate risk  1.2 LHC 1/7/16 - Stent LAD  1.3 stress test 10/9/18-low risk, no ischemia  2. Hypertension  3. Dyspnea  3.1 Echo 11/2/15 - mild LVH; EF > 65%; DD I; trace MR & TR; mild HI; PA 20-25  3.2 Echo 10/9/18-mild LVH, diastolic dysfunction 1, EF 66-70%, trace TR, trace MR  4. Edema  5. Anxiety       6. Dyslipidemia       7. Event Monitor 7/23-8/5/2020 - NSR, ST      8. Moderna Vaccination 1/2021; 2/2021    HPI    Patient is a 52-year-old female who presents to the office for evaluation.  Patient has history of coronary disease with history of stenting in 2016.    Patient does not describe chest pain or pressure.  She does not complain of any shortness of breath.  No complaints of PND or orthopnea.    Patient does not describe palpitating nor does patient complain of dysrhythmic symptoms.  She is stable otherwise.  Current Outpatient Medications on File Prior to Visit   Medication Sig Dispense Refill    aspirin 81 MG EC tablet TAKE 1 TABLET BY MOUTH DAILY 90 tablet 3    atorvastatin (LIPITOR) 40 MG tablet Take 1 tablet by mouth Every Evening. 90 tablet 3    BIOTIN PO Take  by mouth Daily.      buPROPion XL (WELLBUTRIN XL) 300 MG 24 hr tablet Take 1 tablet by mouth Daily. 90 tablet 3    clopidogrel (PLAVIX) 75 MG tablet Take 1 tablet by mouth Daily. 90 tablet 3    escitalopram (Lexapro) 10 MG tablet Take 1 tablet by mouth Daily. 90 tablet 3    furosemide (LASIX) 20 MG tablet Take 1 tablet by mouth 2 (Two) Times a Day.      Loratadine (CLARITIN PO) Take  by mouth.      nitroglycerin (NITROSTAT) 0.4 MG SL tablet Place 1 tablet under the tongue Every 5 (Five) Minutes As Needed for Chest Pain. Take no more than 3 doses in 15 minutes. 45 tablet 3    potassium chloride (K-DUR,KLOR-CON) 10  MEQ CR tablet Take 1 tablet by mouth Daily. 90 tablet 3    potassium chloride 10 MEQ CR tablet Take 1 tablet by mouth 2 (Two) Times a Day.      topiramate (TOPAMAX) 25 MG capsule (sprinkle) Take 1 capsule by mouth 2 (Two) Times a Day.      vitamin D3 125 MCG (5000 UT) capsule capsule Take 1 capsule by mouth Daily.       No current facility-administered medications on file prior to visit.       Erythromycin    Past Medical History:   Diagnosis Date    Ankle fracture, right     She does have edema in her RLE due to cracked bone in her right ankle. Patient says she fell down stairs. She is suppose to have a boot on, but because of the weather she wore a sneaker. Patient says that she had just started to exercise again when this happened. Foot has been painful today.    Anxiety     CAD in native artery     1.1. [facility] 1/7/16 - Stent LAD    Chest pain     COVID-19 vaccine administered     1st - 01/13/2021 , 2nd -02/12/21 - Moderna     Diverticulitis     Dyspnea     3.1 Echo 11/2/15 - mild LVH; EF > 65%; DD I; trace MR & TR; mild ME; PA 20-25    Edema     Related to ankle fracture.    Hyperlipidemia     Hypertension     The patient presents for follow-up of primary hypertension. The patient states she has been stable with her blood pressure control since the last visit. Comorbid Illnesses: coronary artery disease.     Obesity     BMI 31.95.       Social History     Socioeconomic History    Marital status:    Tobacco Use    Smoking status: Never    Smokeless tobacco: Never   Vaping Use    Vaping Use: Never used   Substance and Sexual Activity    Alcohol use: No    Drug use: No    Sexual activity: Defer       Family History   Problem Relation Age of Onset    Colon cancer Other     Stroke Other         Total of 3    Hypertension Other     Heart attack Other     Other Other         High cholesterol & Heart Surgery    Lupus Other         Systemic lupus erythematosus       Review of Systems   Constitutional: Negative.   "  HENT: Negative.     Eyes: Negative.    Respiratory:  Negative for apnea, cough, chest tightness, shortness of breath and wheezing.    Cardiovascular: Negative.  Negative for chest pain, palpitations and leg swelling.   Gastrointestinal: Negative.  Negative for blood in stool.   Endocrine: Negative.    Genitourinary: Negative.  Negative for hematuria.   Musculoskeletal: Negative.    Skin: Negative.  Negative for color change, rash and wound.   Allergic/Immunologic: Negative.    Neurological:  Negative for dizziness, syncope, weakness, light-headedness, numbness and headaches.   Hematological:  Bruises/bleeds easily (BRUISES).   Psychiatric/Behavioral: Negative.  Negative for sleep disturbance.      Objective   Vitals:    08/15/23 0854   BP: 123/70   BP Location: Left arm   Patient Position: Sitting   Cuff Size: Adult   Pulse: 93   SpO2: 96%   Weight: 97.5 kg (215 lb)   Height: 170.2 cm (67\")      /70 (BP Location: Left arm, Patient Position: Sitting, Cuff Size: Adult)   Pulse 93   Ht 170.2 cm (67\")   Wt 97.5 kg (215 lb)   SpO2 96%   BMI 33.67 kg/mý     Lab Results (most recent)       None            Physical Exam  Vitals and nursing note reviewed.   Constitutional:       General: She is not in acute distress.     Appearance: Normal appearance. She is well-developed.   HENT:      Head: Normocephalic and atraumatic.   Eyes:      General: No scleral icterus.        Right eye: No discharge.         Left eye: No discharge.      Conjunctiva/sclera: Conjunctivae normal.   Neck:      Vascular: No carotid bruit.   Cardiovascular:      Rate and Rhythm: Normal rate and regular rhythm.      Heart sounds: Normal heart sounds. No murmur heard.    No friction rub. No gallop.   Pulmonary:      Effort: Pulmonary effort is normal. No respiratory distress.      Breath sounds: Normal breath sounds. No wheezing or rales.   Chest:      Chest wall: No tenderness.   Musculoskeletal:      Right lower leg: No edema.      Left " lower leg: No edema.   Skin:     General: Skin is warm and dry.      Coloration: Skin is not pale.      Findings: No erythema or rash.   Neurological:      Mental Status: She is alert and oriented to person, place, and time.      Cranial Nerves: No cranial nerve deficit.   Psychiatric:         Behavior: Behavior normal.       Procedure   Procedures       Assessment & Plan     Problems Addressed this Visit          Cardiac and Vasculature    Hypertension    Relevant Medications    furosemide (LASIX) 20 MG tablet    Coronary artery disease involving native coronary artery with angina pectoris - Primary    Dyslipidemia     Diagnoses         Codes Comments    Coronary artery disease involving native coronary artery with angina pectoris, unspecified whether native or transplanted heart    -  Primary ICD-10-CM: I25.119  ICD-9-CM: 414.01, 413.9     Primary hypertension     ICD-10-CM: I10  ICD-9-CM: 401.9     Dyslipidemia     ICD-10-CM: E78.5  ICD-9-CM: 272.4           Recommendation  1.  Patient is a 52-year-old female with history of coronary disease doing well.  She has no angina.  She has done well otherwise.  For now, we will make no changes but continue antiplatelet and statin therapy.    2.  Patient with baseline hypertension doing well on medical therapy.  I will continue the same at this time.    3.  Patient with dyslipidemia currently on statin therapy.  I will continue at this time.    4.  We will see patient back for follow-up in 6 months to a year or sooner as symptoms discussed.  Follow-up with primary as scheduled.             Patient brought in medicine list to appointment, it's been reviewed with patient and med list was updated in the chart.    Electronically signed by:

## 2023-08-15 NOTE — LETTER
August 16, 2023       No Recipients    Patient: Radha Van   YOB: 1970   Date of Visit: 8/15/2023       Dear Aldo Walker MD    Radha Van was in my office today. Below is a copy of my note.    If you have questions, please do not hesitate to call me. I look forward to following Radha along with you.         Sincerely,        LISBETH Metcalf        CC:   No Recipients    Problem list     Subjective  Radha Van is a 52 y.o. female     Chief Complaint   Patient presents with    Follow-up     ROUTINE   Problem List:     1. CAD  1.1 stress test 11/2/15-large dense anterior ischemic defect, intermediate risk  1.2 LHC 1/7/16 - Stent LAD  1.3 stress test 10/9/18-low risk, no ischemia  2. Hypertension  3. Dyspnea  3.1 Echo 11/2/15 - mild LVH; EF > 65%; DD I; trace MR & TR; mild CO; PA 20-25  3.2 Echo 10/9/18-mild LVH, diastolic dysfunction 1, EF 66-70%, trace TR, trace MR  4. Edema  5. Anxiety       6. Dyslipidemia       7. Event Monitor 7/23-8/5/2020 - NSR, ST      8. Moderna Vaccination 1/2021; 2/2021    HPI    Patient is a 52-year-old female who presents to the office for evaluation.  Patient has history of coronary disease with history of stenting in 2016.    Patient does not describe chest pain or pressure.  She does not complain of any shortness of breath.  No complaints of PND or orthopnea.    Patient does not describe palpitating nor does patient complain of dysrhythmic symptoms.  She is stable otherwise.  Current Outpatient Medications on File Prior to Visit   Medication Sig Dispense Refill    aspirin 81 MG EC tablet TAKE 1 TABLET BY MOUTH DAILY 90 tablet 3    atorvastatin (LIPITOR) 40 MG tablet Take 1 tablet by mouth Every Evening. 90 tablet 3    BIOTIN PO Take  by mouth Daily.      buPROPion XL (WELLBUTRIN XL) 300 MG 24 hr tablet Take 1 tablet by mouth Daily. 90 tablet 3    clopidogrel (PLAVIX) 75 MG tablet Take 1 tablet by mouth Daily. 90 tablet 3    escitalopram  (Lexapro) 10 MG tablet Take 1 tablet by mouth Daily. 90 tablet 3    furosemide (LASIX) 20 MG tablet Take 1 tablet by mouth 2 (Two) Times a Day.      Loratadine (CLARITIN PO) Take  by mouth.      nitroglycerin (NITROSTAT) 0.4 MG SL tablet Place 1 tablet under the tongue Every 5 (Five) Minutes As Needed for Chest Pain. Take no more than 3 doses in 15 minutes. 45 tablet 3    potassium chloride (K-DUR,KLOR-CON) 10 MEQ CR tablet Take 1 tablet by mouth Daily. 90 tablet 3    potassium chloride 10 MEQ CR tablet Take 1 tablet by mouth 2 (Two) Times a Day.      topiramate (TOPAMAX) 25 MG capsule (sprinkle) Take 1 capsule by mouth 2 (Two) Times a Day.      vitamin D3 125 MCG (5000 UT) capsule capsule Take 1 capsule by mouth Daily.       No current facility-administered medications on file prior to visit.       Erythromycin    Past Medical History:   Diagnosis Date    Ankle fracture, right     She does have edema in her RLE due to cracked bone in her right ankle. Patient says she fell down stairs. She is suppose to have a boot on, but because of the weather she wore a sneaker. Patient says that she had just started to exercise again when this happened. Foot has been painful today.    Anxiety     CAD in native artery     1.1. [facility] 1/7/16 - Stent LAD    Chest pain     COVID-19 vaccine administered     1st - 01/13/2021 , 2nd -02/12/21 - Moderna     Diverticulitis     Dyspnea     3.1 Echo 11/2/15 - mild LVH; EF > 65%; DD I; trace MR & TR; mild FL; PA 20-25    Edema     Related to ankle fracture.    Hyperlipidemia     Hypertension     The patient presents for follow-up of primary hypertension. The patient states she has been stable with her blood pressure control since the last visit. Comorbid Illnesses: coronary artery disease.     Obesity     BMI 31.95.       Social History     Socioeconomic History    Marital status:    Tobacco Use    Smoking status: Never    Smokeless tobacco: Never   Vaping Use     "Vaping Use: Never used   Substance and Sexual Activity    Alcohol use: No    Drug use: No    Sexual activity: Defer       Family History   Problem Relation Age of Onset    Colon cancer Other     Stroke Other         Total of 3    Hypertension Other     Heart attack Other     Other Other         High cholesterol & Heart Surgery    Lupus Other         Systemic lupus erythematosus       Review of Systems   Constitutional: Negative.    HENT: Negative.     Eyes: Negative.    Respiratory:  Negative for apnea, cough, chest tightness, shortness of breath and wheezing.    Cardiovascular: Negative.  Negative for chest pain, palpitations and leg swelling.   Gastrointestinal: Negative.  Negative for blood in stool.   Endocrine: Negative.    Genitourinary: Negative.  Negative for hematuria.   Musculoskeletal: Negative.    Skin: Negative.  Negative for color change, rash and wound.   Allergic/Immunologic: Negative.    Neurological:  Negative for dizziness, syncope, weakness, light-headedness, numbness and headaches.   Hematological:  Bruises/bleeds easily (BRUISES).   Psychiatric/Behavioral: Negative.  Negative for sleep disturbance.      Objective  Vitals:    08/15/23 0854   BP: 123/70   BP Location: Left arm   Patient Position: Sitting   Cuff Size: Adult   Pulse: 93   SpO2: 96%   Weight: 97.5 kg (215 lb)   Height: 170.2 cm (67\")      /70 (BP Location: Left arm, Patient Position: Sitting, Cuff Size: Adult)   Pulse 93   Ht 170.2 cm (67\")   Wt 97.5 kg (215 lb)   SpO2 96%   BMI 33.67 kg/mý     Lab Results (most recent)       None            Physical Exam  Vitals and nursing note reviewed.   Constitutional:       General: She is not in acute distress.     Appearance: Normal appearance. She is well-developed.   HENT:      Head: Normocephalic and atraumatic.   Eyes:      General: No scleral icterus.        Right eye: No discharge.         Left eye: No discharge.      Conjunctiva/sclera: Conjunctivae normal.   Neck: "      Vascular: No carotid bruit.   Cardiovascular:      Rate and Rhythm: Normal rate and regular rhythm.      Heart sounds: Normal heart sounds. No murmur heard.    No friction rub. No gallop.   Pulmonary:      Effort: Pulmonary effort is normal. No respiratory distress.      Breath sounds: Normal breath sounds. No wheezing or rales.   Chest:      Chest wall: No tenderness.   Musculoskeletal:      Right lower leg: No edema.      Left lower leg: No edema.   Skin:     General: Skin is warm and dry.      Coloration: Skin is not pale.      Findings: No erythema or rash.   Neurological:      Mental Status: She is alert and oriented to person, place, and time.      Cranial Nerves: No cranial nerve deficit.   Psychiatric:         Behavior: Behavior normal.       Procedure  Procedures       Assessment & Plan    Problems Addressed this Visit          Cardiac and Vasculature    Hypertension    Relevant Medications    furosemide (LASIX) 20 MG tablet    Coronary artery disease involving native coronary artery with angina pectoris - Primary    Dyslipidemia     Diagnoses         Codes Comments    Coronary artery disease involving native coronary artery with angina pectoris, unspecified whether native or transplanted heart    -  Primary ICD-10-CM: I25.119  ICD-9-CM: 414.01, 413.9     Primary hypertension     ICD-10-CM: I10  ICD-9-CM: 401.9     Dyslipidemia     ICD-10-CM: E78.5  ICD-9-CM: 272.4           Recommendation  1.  Patient is a 52-year-old female with history of coronary disease doing well.  She has no angina.  She has done well otherwise.  For now, we will make no changes but continue antiplatelet and statin therapy.    2.  Patient with baseline hypertension doing well on medical therapy.  I will continue the same at this time.    3.  Patient with dyslipidemia currently on statin therapy.  I will continue at this time.    4.  We will see patient back for follow-up in 6 months to a year or sooner as symptoms discussed.   Follow-up with primary as scheduled.             Patient brought in medicine list to appointment, it's been reviewed with patient and med list was updated in the chart.    Electronically signed by:

## 2023-09-01 DIAGNOSIS — I25.10 CAD IN NATIVE ARTERY: Chronic | ICD-10-CM

## 2023-09-01 RX ORDER — ASPIRIN 81 MG/1
81 TABLET ORAL DAILY
Qty: 90 TABLET | Refills: 3 | Status: SHIPPED | OUTPATIENT
Start: 2023-09-01

## 2023-09-28 DIAGNOSIS — I25.10 CAD IN NATIVE ARTERY: Chronic | ICD-10-CM

## 2023-09-28 RX ORDER — NITROGLYCERIN 0.4 MG/1
0.4 TABLET SUBLINGUAL
Qty: 25 TABLET | Refills: 2 | Status: SHIPPED | OUTPATIENT
Start: 2023-09-28

## 2024-02-25 DIAGNOSIS — I25.119 CORONARY ARTERY DISEASE INVOLVING NATIVE CORONARY ARTERY WITH ANGINA PECTORIS, UNSPECIFIED WHETHER NATIVE OR TRANSPLANTED HEART: ICD-10-CM

## 2024-02-25 DIAGNOSIS — E78.5 DYSLIPIDEMIA: ICD-10-CM

## 2024-02-26 DIAGNOSIS — I25.119 CORONARY ARTERY DISEASE INVOLVING NATIVE CORONARY ARTERY WITH ANGINA PECTORIS, UNSPECIFIED WHETHER NATIVE OR TRANSPLANTED HEART: ICD-10-CM

## 2024-02-26 DIAGNOSIS — E78.5 DYSLIPIDEMIA: ICD-10-CM

## 2024-02-26 RX ORDER — CLOPIDOGREL BISULFATE 75 MG/1
75 TABLET ORAL DAILY
Qty: 90 TABLET | Refills: 3 | Status: SHIPPED | OUTPATIENT
Start: 2024-02-26

## 2024-02-26 RX ORDER — ATORVASTATIN CALCIUM 40 MG/1
40 TABLET, FILM COATED ORAL EVERY EVENING
Qty: 90 TABLET | Refills: 3 | Status: SHIPPED | OUTPATIENT
Start: 2024-02-26

## 2024-04-02 DIAGNOSIS — F41.9 ANXIETY: Chronic | ICD-10-CM

## 2024-04-03 RX ORDER — BUPROPION HYDROCHLORIDE 300 MG/1
300 TABLET ORAL DAILY
Qty: 90 TABLET | Refills: 1 | Status: SHIPPED | OUTPATIENT
Start: 2024-04-03

## 2024-04-03 NOTE — TELEPHONE ENCOUNTER
Name from pharmacy: BUPROPION XL 300MG TABLETS         Will file in chart as: buPROPion XL (WELLBUTRIN XL) 300 MG 24 hr tablet    Sig: Take 1 tablet by mouth Daily.    Original sig: TAKE 1 TABLET BY MOUTH DAILY    Disp: 90 tablet    Refills: 3 (Pharmacy requested: Not specified)    Start: 4/2/2024    Class: Normal    Non-formulary For: Anxiety    Last ordered: 1 year ago (2/27/2023) by LISBETH Serna    Last refill: 11/27/2023    Rx #: [id number]    Atypical Antidepressants Protocol [id number]/02/2024 05:54 PM    PHQ-2 or PHQ9 on record within past 12 months    No active pregnancy on record    No positive pregnancy test in past 12 months    Visit with authorizing provider in past 12 months or upcoming 30 days

## 2024-05-25 DIAGNOSIS — R60.0 PERIPHERAL EDEMA: ICD-10-CM

## 2024-05-28 RX ORDER — POTASSIUM CHLORIDE 750 MG/1
10 TABLET, EXTENDED RELEASE ORAL DAILY
Qty: 90 TABLET | Refills: 3 | Status: SHIPPED | OUTPATIENT
Start: 2024-05-28

## 2024-09-26 DIAGNOSIS — F41.9 ANXIETY: Chronic | ICD-10-CM

## 2024-09-26 RX ORDER — BUPROPION HYDROCHLORIDE 300 MG/1
300 TABLET ORAL DAILY
Qty: 90 TABLET | Refills: 0 | Status: SHIPPED | OUTPATIENT
Start: 2024-09-26

## 2024-10-14 DIAGNOSIS — E78.5 DYSLIPIDEMIA: ICD-10-CM

## 2024-10-14 DIAGNOSIS — I25.119 CORONARY ARTERY DISEASE INVOLVING NATIVE CORONARY ARTERY WITH ANGINA PECTORIS, UNSPECIFIED WHETHER NATIVE OR TRANSPLANTED HEART: ICD-10-CM

## 2024-10-14 RX ORDER — ATORVASTATIN CALCIUM 40 MG/1
40 TABLET, FILM COATED ORAL EVERY EVENING
Qty: 90 TABLET | Refills: 1 | Status: SHIPPED | OUTPATIENT
Start: 2024-10-14

## 2024-12-19 ENCOUNTER — OFFICE VISIT (OUTPATIENT)
Dept: CARDIOLOGY | Facility: CLINIC | Age: 54
End: 2024-12-19
Payer: COMMERCIAL

## 2024-12-19 VITALS
OXYGEN SATURATION: 97 % | HEIGHT: 67 IN | DIASTOLIC BLOOD PRESSURE: 76 MMHG | SYSTOLIC BLOOD PRESSURE: 122 MMHG | BODY MASS INDEX: 33.27 KG/M2 | WEIGHT: 212 LBS | HEART RATE: 66 BPM

## 2024-12-19 DIAGNOSIS — R06.09 DYSPNEA ON EXERTION: ICD-10-CM

## 2024-12-19 DIAGNOSIS — I25.10 CORONARY ARTERY DISEASE INVOLVING NATIVE CORONARY ARTERY OF NATIVE HEART, UNSPECIFIED WHETHER ANGINA PRESENT: Primary | ICD-10-CM

## 2024-12-19 DIAGNOSIS — I25.10 CAD IN NATIVE ARTERY: Chronic | ICD-10-CM

## 2024-12-19 DIAGNOSIS — E78.5 DYSLIPIDEMIA: ICD-10-CM

## 2024-12-19 PROCEDURE — 93000 ELECTROCARDIOGRAM COMPLETE: CPT | Performed by: PHYSICIAN ASSISTANT

## 2024-12-19 RX ORDER — NITROGLYCERIN 0.4 MG/1
0.4 TABLET SUBLINGUAL
Qty: 25 TABLET | Refills: 2 | Status: SHIPPED | OUTPATIENT
Start: 2024-12-19

## 2024-12-19 NOTE — PROGRESS NOTES
Problem list     Subjective   Radha Van is a 54 y.o. female     Chief Complaint   Patient presents with    Follow-up     Yearly follow up     Shortness of Breath   Problem list:  1.  Coronary artery disease.  1.1.  Abnormal stress test with anterior wall ischemia, with eventual stenting of the LAD, 1/2016.  2.  Preserved systolic function.  3.  Hypertension  4.  Dyslipidemia  5.  Anxiety.    HPI  The patient presents in clinic today for yearly follow-up.  For the most part, the patient has done reasonably well from cardiovascular standpoint since last appointment.  She notes increasing dyspnea and fatigue.  She really said no chest pain.  She reports rare palpitations but no sustained dysrhythmic activity.  There is rare dizziness but certainly no presyncope/syncope.  She is being evaluated by her primary care provider and apparently had evaluation through rheumatology for diffuse joint pain.  She did have recent laboratories, where fasting lipid profile also was done.  Total cholesterol was 138, HDL 45, triglycerides 149, and LDL 70.  To her knowledge, she has been normotensive since last evaluation, even discontinuing previous antihypertensive therapy.  Despite being off this, she remains normotensive.  She has no further complaints.    Current Outpatient Medications on File Prior to Visit   Medication Sig Dispense Refill    aspirin 81 MG EC tablet Take 1 tablet by mouth Daily. 90 tablet 3    atorvastatin (LIPITOR) 40 MG tablet TAKE ONE TABLET BY MOUTH EVERY EVENING 90 tablet 1    BIOTIN PO Take  by mouth Daily.      buPROPion XL (WELLBUTRIN XL) 300 MG 24 hr tablet TAKE ONE TABLET BY MOUTH DAILY 90 tablet 0    clopidogrel (PLAVIX) 75 MG tablet TAKE 1 TABLET BY MOUTH DAILY 90 tablet 3    escitalopram (Lexapro) 10 MG tablet Take 1 tablet by mouth Daily. 90 tablet 3    furosemide (LASIX) 20 MG tablet Take 1 tablet by mouth Daily.      Magnesium 250 MG capsule Take  by mouth. daily      nitroglycerin  (NITROSTAT) 0.4 MG SL tablet Place 1 tablet under the tongue Every 5 (Five) Minutes As Needed for Chest Pain. Take no more than 3 doses in 15 minutes. 25 tablet 2    potassium chloride (KLOR-CON M10) 10 MEQ CR tablet TAKE 1 TABLET BY MOUTH DAILY 90 tablet 3    potassium chloride 10 MEQ CR tablet Take 1 tablet by mouth Daily.      topiramate (TOPAMAX) 25 MG capsule (sprinkle) Take 1 capsule by mouth 2 (Two) Times a Day.      vitamin D3 125 MCG (5000 UT) capsule capsule Take 1 capsule by mouth Daily.      [DISCONTINUED] Loratadine (CLARITIN PO) Take  by mouth.       No current facility-administered medications on file prior to visit.       Erythromycin    Past Medical History:   Diagnosis Date    Ankle fracture, right     She does have edema in her RLE due to cracked bone in her right ankle. Patient says she fell down stairs. She is suppose to have a boot on, but because of the weather she wore a sneaker. Patient says that she had just started to exercise again when this happened. Foot has been painful today.    Anxiety     CAD in native artery     1.1. [facility] 1/7/16 - Stent LAD    Chest pain     COVID-19 vaccine administered     1st - 01/13/2021 , 2nd -02/12/21 - Moderna     Diverticulitis     Dyspnea     3.1 Echo 11/2/15 - mild LVH; EF > 65%; DD I; trace MR & TR; mild [place]; PA 20-25    Edema     Related to ankle fracture.    Hyperlipidemia     Hypertension     The patient presents for follow-up of primary hypertension. The patient states she has been stable with her blood pressure control since the last visit. Comorbid Illnesses: coronary artery disease.     Obesity     BMI 31.95.       Social History     Socioeconomic History    Marital status:    Tobacco Use    Smoking status: Never    Smokeless tobacco: Never   Vaping Use    Vaping status: Never Used   Substance and Sexual Activity    Alcohol use: No    Drug use: No    Sexual activity: Defer       Family History   Problem Relation Age of Onset    Colon cancer  "Other     Stroke Other         Total of 3    Hypertension Other     Heart attack Other     Other Other         High cholesterol & Heart Surgery    Lupus Other         Systemic lupus erythematosus       Review of Systems   Constitutional: Negative.  Positive for fatigue. Negative for chills, diaphoresis and fever.   HENT: Negative.     Eyes: Negative.  Negative for visual disturbance.   Respiratory: Negative.  Positive for cough and shortness of breath. Negative for apnea, chest tightness and wheezing.    Cardiovascular: Negative.  Positive for leg swelling. Negative for chest pain and palpitations.   Gastrointestinal: Negative.  Negative for abdominal pain and blood in stool.   Endocrine: Negative.    Genitourinary: Negative.  Negative for hematuria.   Musculoskeletal: Negative.  Positive for arthralgias, back pain, myalgias, neck pain and neck stiffness.   Skin: Negative.  Negative for rash and wound.   Allergic/Immunologic: Positive for environmental allergies (seasonal). Negative for food allergies.   Neurological:  Negative for dizziness, syncope, weakness, light-headedness, numbness and headaches.   Hematological: Negative.  Bruises/bleeds easily.   Psychiatric/Behavioral: Negative.  Negative for sleep disturbance.        Objective   Vitals:    12/19/24 0918   BP: 122/76   Pulse: 66   SpO2: 97%   Weight: 96.2 kg (212 lb)   Height: 170.2 cm (67\")      /76   Pulse 66   Ht 170.2 cm (67\")   Wt 96.2 kg (212 lb)   SpO2 97%   BMI 33.20 kg/m²    Lab Results (most recent)       None          Physical Exam  Vitals and nursing note reviewed.   Constitutional:       General: She is not in acute distress.     Appearance: She is well-developed.   HENT:      Head: Normocephalic and atraumatic.   Eyes:      Conjunctiva/sclera: Conjunctivae normal.      Pupils: Pupils are equal, round, and reactive to light.   Neck:      Vascular: No JVD.      Trachea: No tracheal deviation.   Cardiovascular:      Rate and Rhythm: " Normal rate and regular rhythm.      Heart sounds: Normal heart sounds.   Pulmonary:      Effort: Pulmonary effort is normal.      Breath sounds: Normal breath sounds.   Abdominal:      General: Bowel sounds are normal. There is no distension.      Palpations: Abdomen is soft. There is no mass.      Tenderness: There is no abdominal tenderness. There is no guarding or rebound.   Musculoskeletal:         General: No tenderness or deformity. Normal range of motion.      Cervical back: Normal range of motion and neck supple.   Skin:     General: Skin is warm and dry.      Coloration: Skin is not pale.      Findings: No erythema or rash.   Neurological:      Mental Status: She is alert and oriented to person, place, and time.   Psychiatric:         Behavior: Behavior normal.         Thought Content: Thought content normal.         Judgment: Judgment normal.           Procedure     ECG 12 Lead    Date/Time: 12/19/2024 9:23 AM  Performed by: Alhaji Oswald PA    Authorized by: Alhaji Oswald PA  Comparison: compared with previous ECG from 9/28/2022  Comparison to previous ECG: Sinus rhythm, rate 67, first-degree AV block, no acute changes noted.             Assessment & Plan      Diagnosis Plan   1. Coronary artery disease involving native coronary artery of native heart, unspecified whether angina present  Adult Transthoracic Echo Complete W/ Cont if Necessary Per Protocol    Stress Test With Myocardial Perfusion One Day      2. Dyspnea on exertion  Adult Transthoracic Echo Complete W/ Cont if Necessary Per Protocol    Stress Test With Myocardial Perfusion One Day      3. Dyslipidemia  Adult Transthoracic Echo Complete W/ Cont if Necessary Per Protocol    Stress Test With Myocardial Perfusion One Day        1.  The patient presents for yearly follow-up.  She has not had any formal cardiovascular testing for approximately 6 years.  She has noted some degree of progression of symptoms which could possibly reflect  angina.  We will schedule for noninvasive testing at this time.    2.  She will have nuclear stress test for ischemia assessment and risk stratification.    3.  We will do an echo to reevaluate systolic function and cardiac parameters otherwise.    4.  She has diffuse muscle and joint discomfort.  She will hold statin for 2 weeks to see if this is contributing.  If no improvement is noted she will resume this.  If improvement is noted, we will consider alternate statin.  I would continue medical therapy otherwise without change.    5.  We will see her routinely through the clinic.  If testing is abnormal, we will see her immediately.  If benign and at baseline, we will resume yearly follow-ups.                   Electronically signed by:

## 2024-12-30 DIAGNOSIS — F41.9 ANXIETY: Chronic | ICD-10-CM

## 2024-12-30 RX ORDER — BUPROPION HYDROCHLORIDE 300 MG/1
300 TABLET ORAL DAILY
Qty: 90 TABLET | Refills: 0 | OUTPATIENT
Start: 2024-12-30

## 2025-01-14 ENCOUNTER — TELEPHONE (OUTPATIENT)
Dept: CARDIOLOGY | Facility: CLINIC | Age: 55
End: 2025-01-14
Payer: COMMERCIAL

## 2025-01-30 ENCOUNTER — TELEPHONE (OUTPATIENT)
Dept: CARDIOLOGY | Facility: CLINIC | Age: 55
End: 2025-01-30
Payer: COMMERCIAL

## 2025-01-30 DIAGNOSIS — E78.5 DYSLIPIDEMIA: Primary | ICD-10-CM

## 2025-01-30 RX ORDER — ROSUVASTATIN CALCIUM 10 MG/1
10 TABLET, COATED ORAL DAILY
Qty: 30 TABLET | Refills: 11 | Status: SHIPPED | OUTPATIENT
Start: 2025-01-30

## 2025-01-30 NOTE — TELEPHONE ENCOUNTER
Per Alhaji bryant rosuvastatin 10 mg daily and repeat cmp , lipids in 8 weeks. If patient unable to tolerate will refer to lipid clinic.    Patient notified of above recommendation's. Orders entered.

## 2025-01-30 NOTE — TELEPHONE ENCOUNTER
Caller: Radha Van    Relationship: Self    Best call back number: [phone] (home)     What is the best time to reach you: ANYTIME    Who are you requesting to speak with (clinical staff, provider,  specific staff member): CLINICAL    What was the call regarding: PT LEFT A VM EARLIER THIS MONTH ABOUT STOPPING HER MEDICATION atorvastatin (LIPITOR) 40 MG tablet. STOPPING THE MEDICATION MADE HER SYMPTOMS SHE WAS HAVING GO AWAY. SHE SAID SHE WAS TOLD TO LET PROVIDER KNOW. PT HAS NOT HEARD ANYTHING SINCE SHE FIRST LEFT THE VOICEMAIL EARLIER THIS MONTH. MESSAGE SHOWS IN CHART FROM 1.14.25 THAT MENTIONS THIS INFORMATION. PT WOULD LIKE A CALL BACK TO CONFIRM RECEIPT OF THIS INFORMATION BY PROVIDER, PLEASE.

## 2025-03-03 DIAGNOSIS — I25.119 CORONARY ARTERY DISEASE INVOLVING NATIVE CORONARY ARTERY WITH ANGINA PECTORIS, UNSPECIFIED WHETHER NATIVE OR TRANSPLANTED HEART: ICD-10-CM

## 2025-03-03 DIAGNOSIS — R60.0 PERIPHERAL EDEMA: ICD-10-CM

## 2025-03-03 DIAGNOSIS — E78.5 DYSLIPIDEMIA: ICD-10-CM

## 2025-03-03 RX ORDER — CLOPIDOGREL BISULFATE 75 MG/1
75 TABLET ORAL DAILY
Qty: 90 TABLET | Refills: 2 | Status: SHIPPED | OUTPATIENT
Start: 2025-03-03

## 2025-03-03 RX ORDER — POTASSIUM CHLORIDE 750 MG/1
10 TABLET, EXTENDED RELEASE ORAL DAILY
Qty: 90 TABLET | Refills: 2 | Status: SHIPPED | OUTPATIENT
Start: 2025-03-03

## 2025-03-24 ENCOUNTER — LAB (OUTPATIENT)
Dept: LAB | Facility: HOSPITAL | Age: 55
End: 2025-03-24
Payer: COMMERCIAL

## 2025-03-24 DIAGNOSIS — E78.5 DYSLIPIDEMIA: ICD-10-CM

## 2025-03-24 LAB
ALBUMIN SERPL-MCNC: 4.6 G/DL (ref 3.5–5.2)
ALBUMIN/GLOB SERPL: 1.8 G/DL
ALP SERPL-CCNC: 84 U/L (ref 39–117)
ALT SERPL W P-5'-P-CCNC: 14 U/L (ref 1–33)
ANION GAP SERPL CALCULATED.3IONS-SCNC: 8.9 MMOL/L (ref 5–15)
AST SERPL-CCNC: 14 U/L (ref 1–32)
BILIRUB SERPL-MCNC: 0.3 MG/DL (ref 0–1.2)
BUN SERPL-MCNC: 14 MG/DL (ref 6–20)
BUN/CREAT SERPL: 11.8 (ref 7–25)
CALCIUM SPEC-SCNC: 9.5 MG/DL (ref 8.6–10.5)
CHLORIDE SERPL-SCNC: 105 MMOL/L (ref 98–107)
CHOLEST SERPL-MCNC: 132 MG/DL (ref 0–200)
CO2 SERPL-SCNC: 25.1 MMOL/L (ref 22–29)
CREAT SERPL-MCNC: 1.19 MG/DL (ref 0.57–1)
EGFRCR SERPLBLD CKD-EPI 2021: 54.4 ML/MIN/1.73
GLOBULIN UR ELPH-MCNC: 2.5 GM/DL
GLUCOSE SERPL-MCNC: 108 MG/DL (ref 65–99)
HDLC SERPL-MCNC: 52 MG/DL (ref 40–60)
LDLC SERPL CALC-MCNC: 61 MG/DL (ref 0–100)
LDLC/HDLC SERPL: 1.15 {RATIO}
POTASSIUM SERPL-SCNC: 4.1 MMOL/L (ref 3.5–5.2)
PROT SERPL-MCNC: 7.1 G/DL (ref 6–8.5)
SODIUM SERPL-SCNC: 139 MMOL/L (ref 136–145)
TRIGL SERPL-MCNC: 100 MG/DL (ref 0–150)
VLDLC SERPL-MCNC: 19 MG/DL (ref 5–40)

## 2025-03-24 PROCEDURE — 36415 COLL VENOUS BLD VENIPUNCTURE: CPT

## 2025-03-24 PROCEDURE — 80053 COMPREHEN METABOLIC PANEL: CPT

## 2025-03-24 PROCEDURE — 80061 LIPID PANEL: CPT

## 2025-04-02 ENCOUNTER — RESULTS FOLLOW-UP (OUTPATIENT)
Dept: CARDIOLOGY | Facility: CLINIC | Age: 55
End: 2025-04-02
Payer: COMMERCIAL

## 2025-04-02 NOTE — TELEPHONE ENCOUNTER
----- Message from Sandeep Galeano sent at 4/1/2025  5:03 PM EDT -----  CMP showed elevated glucose at 108.  Creatinine 1.19 with an EGFR of 54.4.  Patient does have decreased renal function and will need to be monitored.  Please forward to patient's PCP.    Lipid panel with triglycerides 100, HDL 52, LDL 61.    Keep follow-up.  ----- Message -----  From: Lab, Background User  Sent: 3/24/2025   2:44 PM EDT  To: LISBETH Olvera      Patient notified of result's and recommendation's, copy of labs forwarded to PCP.